# Patient Record
Sex: FEMALE
[De-identification: names, ages, dates, MRNs, and addresses within clinical notes are randomized per-mention and may not be internally consistent; named-entity substitution may affect disease eponyms.]

---

## 2003-09-29 LAB
AMB EXT ANTIBODY SCREEN: NEGATIVE
AMB EXT HBSAG SCREEN: NOT DETECTED
AMB EXT RH FACTOR: POSITIVE

## 2016-08-17 LAB
AMB EXT CHOL/HDL RATIO: 3.1
AMB EXT CHOLESTEROL, TOTAL: 172 MG/DL
AMB EXT CREATININE: 0.5 MG/DL
AMB EXT GFR: 140
AMB EXT GLUCOSE: 90 MG/DL
AMB EXT HDL CHOLESTEROL: 55 MG/DL
AMB EXT LDL CHOLESTEROL, DIRECT: 100 MG/DL
AMB EXT TRIGLYCERIDES: 85 MG/DL
AMB EXT TSH: 0.53 MIU/ML
AMB EXT VLDL: 17 MG/DL
HCT: 38.6 % (ref 35–48)
HGB: 12.5 G/DL (ref 12–16)
MEAN CELL VOLUME: 88.7 FL (ref 80–100)
PLATELETS: 245 10(3)UL
WBC: 8.8 X10(3) UL (ref 4–11)

## 2017-02-03 ENCOUNTER — PRIOR ORIGINAL RECORDS (OUTPATIENT)
Dept: OTHER | Age: 37
End: 2017-02-03

## 2017-02-03 ENCOUNTER — MYAURORA ACCOUNT LINK (OUTPATIENT)
Dept: OTHER | Age: 37
End: 2017-02-03

## 2017-02-15 ENCOUNTER — HOSPITAL ENCOUNTER (OUTPATIENT)
Dept: CV DIAGNOSTICS | Facility: HOSPITAL | Age: 37
End: 2017-02-15
Attending: INTERNAL MEDICINE
Payer: MEDICAID

## 2017-02-15 ENCOUNTER — HOSPITAL ENCOUNTER (OUTPATIENT)
Dept: ULTRASOUND IMAGING | Facility: HOSPITAL | Age: 37
Discharge: HOME | End: 2017-02-15
Attending: INTERNAL MEDICINE
Payer: MEDICAID

## 2017-02-15 ENCOUNTER — HOSPITAL ENCOUNTER (OUTPATIENT)
Dept: MAMMOGRAPHY | Facility: HOSPITAL | Age: 37
Discharge: HOME | End: 2017-02-15
Attending: INTERNAL MEDICINE
Payer: MEDICAID

## 2017-02-15 DIAGNOSIS — N64.4 BREAST PAIN: ICD-10-CM

## 2017-02-15 PROCEDURE — 76642 ULTRASOUND BREAST LIMITED: CPT

## 2017-02-15 PROCEDURE — G0204 DX MAMMO INCL CAD BI: HCPCS

## 2017-02-15 PROCEDURE — 77066 DX MAMMO INCL CAD BI: CPT

## 2017-02-15 PROCEDURE — 77062 BREAST TOMOSYNTHESIS BI: CPT

## 2017-08-21 ENCOUNTER — HOSPITAL ENCOUNTER (EMERGENCY)
Facility: HOSPITAL | Age: 37
Discharge: HOME | End: 2017-08-21
Attending: EMERGENCY MEDICINE
Payer: MEDICAID

## 2017-08-21 ENCOUNTER — APPOINTMENT (OUTPATIENT)
Dept: CT IMAGING | Facility: HOSPITAL | Age: 37
End: 2017-08-21
Attending: EMERGENCY MEDICINE
Payer: MEDICAID

## 2017-08-21 VITALS
HEART RATE: 89 BPM | OXYGEN SATURATION: 100 % | DIASTOLIC BLOOD PRESSURE: 75 MMHG | TEMPERATURE: 98 F | HEIGHT: 64 IN | BODY MASS INDEX: 20.83 KG/M2 | WEIGHT: 122 LBS | RESPIRATION RATE: 18 BRPM | SYSTOLIC BLOOD PRESSURE: 115 MMHG

## 2017-08-21 DIAGNOSIS — R55 SYNCOPE AND COLLAPSE: Primary | ICD-10-CM

## 2017-08-21 DIAGNOSIS — E86.0 DEHYDRATION: ICD-10-CM

## 2017-08-21 DIAGNOSIS — N30.00 ACUTE CYSTITIS WITHOUT HEMATURIA: ICD-10-CM

## 2017-08-21 LAB
ANION GAP SERPL CALC-SCNC: 6 MMOL/L (ref 0–18)
B-HCG UR QL: NEGATIVE
BASOPHILS # BLD: 0.1 K/UL (ref 0–0.2)
BASOPHILS NFR BLD: 1 %
BILIRUB UR QL: NEGATIVE
BUN SERPL-MCNC: 11 MG/DL (ref 8–20)
BUN/CREAT SERPL: 11.5 (ref 10–20)
CALCIUM SERPL-MCNC: 9.1 MG/DL (ref 8.5–10.5)
CHLORIDE SERPL-SCNC: 109 MMOL/L (ref 95–110)
CO2 SERPL-SCNC: 21 MMOL/L (ref 22–32)
COLOR UR: YELLOW
CREAT SERPL-MCNC: 0.96 MG/DL (ref 0.5–1.5)
EOSINOPHIL # BLD: 0.1 K/UL (ref 0–0.7)
EOSINOPHIL NFR BLD: 1 %
ERYTHROCYTE [DISTWIDTH] IN BLOOD BY AUTOMATED COUNT: 13.2 % (ref 11–15)
GLUCOSE SERPL-MCNC: 113 MG/DL (ref 70–99)
GLUCOSE UR-MCNC: NEGATIVE MG/DL
HCT VFR BLD AUTO: 38.3 % (ref 35–48)
HGB BLD-MCNC: 13.1 G/DL (ref 12–16)
HYALINE CASTS #/AREA URNS AUTO: 7 /LPF
LACTATE SERPL-SCNC: 0.8 MMOL/L (ref 0.5–2.2)
LYMPHOCYTES # BLD: 2.7 K/UL (ref 1–4)
LYMPHOCYTES NFR BLD: 26 %
MCH RBC QN AUTO: 29.1 PG (ref 27–32)
MCHC RBC AUTO-ENTMCNC: 34.1 G/DL (ref 32–37)
MCV RBC AUTO: 85.1 FL (ref 80–100)
MONOCYTES # BLD: 0.7 K/UL (ref 0–1)
MONOCYTES NFR BLD: 7 %
NEUTROPHILS # BLD AUTO: 6.6 K/UL (ref 1.8–7.7)
NEUTROPHILS NFR BLD: 64 %
NITRITE UR QL STRIP.AUTO: NEGATIVE
OSMOLALITY UR CALC.SUM OF ELEC: 282 MOSM/KG (ref 275–295)
PH UR: 6 [PH] (ref 5–8)
PLATELET # BLD AUTO: 204 K/UL (ref 140–400)
PMV BLD AUTO: 9.1 FL (ref 7.4–10.3)
POTASSIUM SERPL-SCNC: 3.7 MMOL/L (ref 3.3–5.1)
PROT UR-MCNC: NEGATIVE MG/DL
RBC # BLD AUTO: 4.51 M/UL (ref 3.7–5.4)
RBC #/AREA URNS AUTO: 18 /HPF
SODIUM SERPL-SCNC: 136 MMOL/L (ref 136–144)
SP GR UR STRIP: 1.02 (ref 1–1.03)
TROPONIN I SERPL-MCNC: 0 NG/ML (ref ?–0.03)
UROBILINOGEN UR STRIP-ACNC: 2
VIT C UR-MCNC: NEGATIVE MG/DL
WBC # BLD AUTO: 10.2 K/UL (ref 4–11)
WBC #/AREA URNS AUTO: 7 /HPF

## 2017-08-21 PROCEDURE — 96375 TX/PRO/DX INJ NEW DRUG ADDON: CPT

## 2017-08-21 PROCEDURE — 93010 ELECTROCARDIOGRAM REPORT: CPT | Performed by: EMERGENCY MEDICINE

## 2017-08-21 PROCEDURE — 83605 ASSAY OF LACTIC ACID: CPT | Performed by: EMERGENCY MEDICINE

## 2017-08-21 PROCEDURE — 81001 URINALYSIS AUTO W/SCOPE: CPT | Performed by: EMERGENCY MEDICINE

## 2017-08-21 PROCEDURE — 85025 COMPLETE CBC W/AUTO DIFF WBC: CPT | Performed by: EMERGENCY MEDICINE

## 2017-08-21 PROCEDURE — 93005 ELECTROCARDIOGRAM TRACING: CPT

## 2017-08-21 PROCEDURE — 80048 BASIC METABOLIC PNL TOTAL CA: CPT | Performed by: EMERGENCY MEDICINE

## 2017-08-21 PROCEDURE — 96365 THER/PROPH/DIAG IV INF INIT: CPT

## 2017-08-21 PROCEDURE — 99285 EMERGENCY DEPT VISIT HI MDM: CPT

## 2017-08-21 PROCEDURE — 96361 HYDRATE IV INFUSION ADD-ON: CPT

## 2017-08-21 PROCEDURE — 87086 URINE CULTURE/COLONY COUNT: CPT | Performed by: EMERGENCY MEDICINE

## 2017-08-21 PROCEDURE — 84484 ASSAY OF TROPONIN QUANT: CPT | Performed by: EMERGENCY MEDICINE

## 2017-08-21 PROCEDURE — 70450 CT HEAD/BRAIN W/O DYE: CPT | Performed by: EMERGENCY MEDICINE

## 2017-08-21 PROCEDURE — 81025 URINE PREGNANCY TEST: CPT

## 2017-08-21 RX ORDER — NITROFURANTOIN 25; 75 MG/1; MG/1
100 CAPSULE ORAL 2 TIMES DAILY
Qty: 10 CAPSULE | Refills: 0 | Status: SHIPPED | OUTPATIENT
Start: 2017-08-21 | End: 2017-08-26

## 2017-08-21 RX ORDER — KETOROLAC TROMETHAMINE 30 MG/ML
15 INJECTION, SOLUTION INTRAMUSCULAR; INTRAVENOUS ONCE
Status: COMPLETED | OUTPATIENT
Start: 2017-08-21 | End: 2017-08-21

## 2017-08-21 RX ORDER — CIPROFLOXACIN 2 MG/ML
400 INJECTION, SOLUTION INTRAVENOUS ONCE
Status: COMPLETED | OUTPATIENT
Start: 2017-08-21 | End: 2017-08-21

## 2017-08-21 NOTE — ED INITIAL ASSESSMENT (HPI)
States she \"  Passed out\" PTA. No Trauma noted   states that he Pt's eye rolled back and she was \" pit  For 20 sec\". No N/V. No ETOH usage.

## 2017-08-21 NOTE — ED NOTES
Patient was standing up talking to a friend, when she started to feel nauseous and dizzy. Had a syncopal episode that lasted 30 seconds, did not hit head. Now she feels tired, weak and dizzy. States she had a migraine earlier today, and took furecet.

## 2017-08-21 NOTE — ED PROVIDER NOTES
Patient Seen in: [facility] AND CLINICS Emergency Department    History   No chief complaint on file. Stated Complaint: Syncope    HPI    39year old female with pmh frequent migraines on fioricet, GERD who presents with acute syncopal episode just pta.  Pt 100 MG Oral Tab,  Take 1 tablet (100 mg total) by mouth every 2 (two) hours as needed for Migraine.  Use at onset; repeat once after 2 HRS-ONLY 2 IN 24 HR MAX       Family History   Problem Relation Age of Onset   • Diabetes Father      CRF   • Hypertension time. She has normal strength. She is not disoriented. She displays no tremor. No cranial nerve deficit or sensory deficit. She displays no seizure activity. Coordination and gait normal. GCS eye subscore is 4. GCS verbal subscore is 5.  GCS motor subscore noted on EKG Report.   Rate: 93  Rhythm: Sinus Rhythm  Reading: NSR - no prolonged QT, Brugada, or WPW           ============================================================  ED Course  ------------------------------------------------------------  MDM     P hospitalization for a related event. \"    -------------------------    Patient is feeling better, compared to previous encounters her blood pressure seems consistent as she is usually 90s–100s sbp.  Lactate negative, trop negative, no cp, mild ha with lebron

## 2017-09-20 ENCOUNTER — HOSPITAL ENCOUNTER (OUTPATIENT)
Age: 37
Discharge: HOME | End: 2017-09-20
Attending: EMERGENCY MEDICINE
Payer: MEDICAID

## 2017-09-20 VITALS
RESPIRATION RATE: 16 BRPM | WEIGHT: 123 LBS | DIASTOLIC BLOOD PRESSURE: 71 MMHG | HEART RATE: 104 BPM | TEMPERATURE: 98 F | OXYGEN SATURATION: 98 % | SYSTOLIC BLOOD PRESSURE: 110 MMHG | BODY MASS INDEX: 21 KG/M2

## 2017-09-20 DIAGNOSIS — J02.0 STREP PHARYNGITIS: Primary | ICD-10-CM

## 2017-09-20 LAB — S PYO AG THROAT QL: POSITIVE

## 2017-09-20 PROCEDURE — 87430 STREP A AG IA: CPT

## 2017-09-20 PROCEDURE — 99213 OFFICE O/P EST LOW 20 MIN: CPT

## 2017-09-20 PROCEDURE — 99214 OFFICE O/P EST MOD 30 MIN: CPT

## 2017-09-20 RX ORDER — AMOXICILLIN 500 MG/1
500 TABLET, FILM COATED ORAL 3 TIMES DAILY
Qty: 30 TABLET | Refills: 0 | Status: SHIPPED | OUTPATIENT
Start: 2017-09-20 | End: 2017-09-30

## 2017-09-20 NOTE — ED PROVIDER NOTES
Patient Seen in: [facility] AND CLINICS Immediate Care In [address] [place] [address]    History   Patient presents with:  Cough/URI    Stated Complaint: Sore Throat/Fever/HA    HPI    Patient is a 40-year-old female complains of 5 days of sore throat congestion and cough.   She ear normal.   Nose: Nose normal.   Mouth/Throat: Oropharynx is injected there is tonsillar hypertrophy there is no exudate  Eyes: Conjunctivae and EOM are normal. Pupils are equal, round, and reactive to light. Neck: Neck supple.    Cardiovascular: Normal

## 2017-12-02 ENCOUNTER — HOSPITAL ENCOUNTER (OUTPATIENT)
Age: 37
Discharge: HOME | End: 2017-12-02
Attending: PEDIATRICS
Payer: MEDICAID

## 2017-12-02 VITALS
SYSTOLIC BLOOD PRESSURE: 109 MMHG | WEIGHT: 125 LBS | RESPIRATION RATE: 18 BRPM | HEART RATE: 85 BPM | OXYGEN SATURATION: 100 % | BODY MASS INDEX: 21 KG/M2 | DIASTOLIC BLOOD PRESSURE: 71 MMHG | TEMPERATURE: 98 F

## 2017-12-02 DIAGNOSIS — R81 GLUCOSURIA: ICD-10-CM

## 2017-12-02 DIAGNOSIS — N30.01 ACUTE CYSTITIS WITH HEMATURIA: Primary | ICD-10-CM

## 2017-12-02 PROCEDURE — 82962 GLUCOSE BLOOD TEST: CPT

## 2017-12-02 PROCEDURE — 81025 URINE PREGNANCY TEST: CPT

## 2017-12-02 PROCEDURE — 99214 OFFICE O/P EST MOD 30 MIN: CPT

## 2017-12-02 PROCEDURE — 81002 URINALYSIS NONAUTO W/O SCOPE: CPT

## 2017-12-02 PROCEDURE — 99213 OFFICE O/P EST LOW 20 MIN: CPT

## 2017-12-02 RX ORDER — SULFAMETHOXAZOLE AND TRIMETHOPRIM 800; 160 MG/1; MG/1
1 TABLET ORAL 2 TIMES DAILY
Qty: 10 TABLET | Refills: 0 | Status: SHIPPED | OUTPATIENT
Start: 2017-12-02 | End: 2017-12-07

## 2017-12-02 RX ORDER — ERGOCALCIFEROL 1.25 MG/1
CAPSULE ORAL
Refills: 5 | COMMUNITY
Start: 2017-09-14

## 2017-12-02 RX ORDER — SULFAMETHOXAZOLE AND TRIMETHOPRIM 800; 160 MG/1; MG/1
1 TABLET ORAL 2 TIMES DAILY
Qty: 10 TABLET | Refills: 0 | Status: SHIPPED | OUTPATIENT
Start: 2017-12-02 | End: 2017-12-02

## 2017-12-03 NOTE — ED PROVIDER NOTES
Patient Seen in: [facility] AND CLIN[facility] Immediate Care In [address] [place] [address]    History   Patient presents with:  Urinary Symptoms (urologic)    Stated Complaint: painful urination    HPI    Patient complains of urinary frequency, urgency and dysuria that began 3 day and vital signs reviewed. All other systems reviewed and negative except as noted above. PSFH elements reviewed from today and agreed except as otherwise stated in HPI.     Physical Exam   ED Triage Vitals [12/02/17 1739]  BP: 109/71  Pulse: 85  Res

## 2017-12-08 LAB
AMB EXT CREATININE: 0.7 MG/DL
AMB EXT GFR: 94
AMB EXT GLUCOSE: 93 MG/DL
AMB EXT HGBA1C: 5.3 %

## 2018-03-05 ENCOUNTER — HOSPITAL ENCOUNTER (EMERGENCY)
Facility: HOSPITAL | Age: 38
Discharge: HOME | End: 2018-03-05
Attending: EMERGENCY MEDICINE
Payer: MEDICAID

## 2018-03-05 VITALS
TEMPERATURE: 98 F | OXYGEN SATURATION: 100 % | BODY MASS INDEX: 21.34 KG/M2 | SYSTOLIC BLOOD PRESSURE: 119 MMHG | DIASTOLIC BLOOD PRESSURE: 80 MMHG | RESPIRATION RATE: 16 BRPM | HEART RATE: 87 BPM | WEIGHT: 125 LBS | HEIGHT: 64 IN

## 2018-03-05 DIAGNOSIS — H66.90 ACUTE OTITIS MEDIA, UNSPECIFIED OTITIS MEDIA TYPE: Primary | ICD-10-CM

## 2018-03-05 PROCEDURE — 99283 EMERGENCY DEPT VISIT LOW MDM: CPT

## 2018-03-05 RX ORDER — AMOXICILLIN 500 MG/1
500 TABLET, FILM COATED ORAL 3 TIMES DAILY
Qty: 30 TABLET | Refills: 0 | Status: SHIPPED | OUTPATIENT
Start: 2018-03-05 | End: 2018-03-15

## 2018-03-05 NOTE — ED INITIAL ASSESSMENT (HPI)
Left ear pain started last night, feels like \"hit knife going through ear. \" No drainage. Took Motrin 600mg last night at 2300 with mild relief for 10-15 minutes.

## 2018-03-05 NOTE — ED PROVIDER NOTES
Patient Seen in: [facility] AND [facility] Emergency Department    History   Patient presents with:  Ear Problem Pain (neurosensory)    Stated Complaint: left ear pain     HPI    Left ear pain started last night and has worsened. No fever. No URI symptoms.  No tr There is no rebound and no guarding. Musculoskeletal: Normal range of motion. She exhibits no edema or tenderness. Lymphadenopathy:     She has no cervical adenopathy. Neurological: She is alert and oriented to person, place, and time.  No cranial ner

## 2018-10-22 ENCOUNTER — HOSPITAL ENCOUNTER (EMERGENCY)
Facility: HOSPITAL | Age: 38
Discharge: STILL ADMITTED OR ADMINISTRATIVE | End: 2018-10-22
Payer: MEDICAID

## 2019-02-18 ENCOUNTER — HOSPITAL ENCOUNTER (EMERGENCY)
Facility: HOSPITAL | Age: 39
Discharge: HOME | End: 2019-02-18
Attending: EMERGENCY MEDICINE
Payer: MEDICAID

## 2019-02-18 VITALS
DIASTOLIC BLOOD PRESSURE: 76 MMHG | WEIGHT: 104 LBS | HEART RATE: 90 BPM | RESPIRATION RATE: 18 BRPM | TEMPERATURE: 99 F | SYSTOLIC BLOOD PRESSURE: 107 MMHG | OXYGEN SATURATION: 97 % | BODY MASS INDEX: 17.75 KG/M2 | HEIGHT: 64 IN

## 2019-02-18 DIAGNOSIS — B34.9 VIRAL SYNDROME: Primary | ICD-10-CM

## 2019-02-18 DIAGNOSIS — R10.32 ABDOMINAL PAIN, LEFT LOWER QUADRANT: ICD-10-CM

## 2019-02-18 LAB
ANION GAP SERPL CALC-SCNC: 6 MMOL/L (ref 0–18)
B-HCG UR QL: NEGATIVE
BASOPHILS # BLD AUTO: 0.07 X10(3) UL (ref 0–0.2)
BASOPHILS NFR BLD AUTO: 0.8 %
BILIRUB UR QL: NEGATIVE
BUN BLD-MCNC: 7 MG/DL (ref 7–18)
BUN/CREAT SERPL: 7.9 (ref 10–20)
CALCIUM BLD-MCNC: 9.2 MG/DL (ref 8.5–10.1)
CHLORIDE SERPL-SCNC: 110 MMOL/L (ref 98–107)
CO2 SERPL-SCNC: 23 MMOL/L (ref 21–32)
COLOR UR: YELLOW
CREAT BLD-MCNC: 0.89 MG/DL (ref 0.55–1.02)
DEPRECATED RDW RBC AUTO: 44.4 FL (ref 35.1–46.3)
EOSINOPHIL # BLD AUTO: 0.04 X10(3) UL (ref 0–0.7)
EOSINOPHIL NFR BLD AUTO: 0.5 %
ERYTHROCYTE [DISTWIDTH] IN BLOOD BY AUTOMATED COUNT: 13.4 % (ref 11–15)
GLUCOSE BLD-MCNC: 91 MG/DL (ref 70–99)
GLUCOSE UR-MCNC: NEGATIVE MG/DL
HCT VFR BLD AUTO: 39.2 % (ref 35–48)
HGB BLD-MCNC: 12.4 G/DL (ref 12–16)
IMM GRANULOCYTES # BLD AUTO: 0.03 X10(3) UL (ref 0–1)
IMM GRANULOCYTES NFR BLD: 0.3 %
LYMPHOCYTES # BLD AUTO: 1.22 X10(3) UL (ref 1–4)
LYMPHOCYTES NFR BLD AUTO: 14.1 %
MCH RBC QN AUTO: 28.4 PG (ref 26–34)
MCHC RBC AUTO-ENTMCNC: 31.6 G/DL (ref 31–37)
MCV RBC AUTO: 89.9 FL (ref 80–100)
MONOCYTES # BLD AUTO: 0.79 X10(3) UL (ref 0.1–1)
MONOCYTES NFR BLD AUTO: 9.1 %
NEUTROPHILS # BLD AUTO: 6.5 X10 (3) UL (ref 1.5–7.7)
NEUTROPHILS # BLD AUTO: 6.5 X10(3) UL (ref 1.5–7.7)
NEUTROPHILS NFR BLD AUTO: 75.2 %
NITRITE UR QL STRIP.AUTO: NEGATIVE
OSMOLALITY SERPL CALC.SUM OF ELEC: 286 MOSM/KG (ref 275–295)
PH UR: 6 [PH] (ref 5–8)
PLATELET # BLD AUTO: 250 10(3)UL (ref 150–450)
POTASSIUM SERPL-SCNC: 4 MMOL/L (ref 3.5–5.1)
PROT UR-MCNC: NEGATIVE MG/DL
RBC # BLD AUTO: 4.36 X10(6)UL (ref 3.8–5.3)
RBC #/AREA URNS AUTO: 35 /HPF
SODIUM SERPL-SCNC: 139 MMOL/L (ref 136–145)
SP GR UR STRIP: 1.02 (ref 1–1.03)
UROBILINOGEN UR STRIP-ACNC: <2
VIT C UR-MCNC: 20 MG/DL
WBC # BLD AUTO: 8.7 X10(3) UL (ref 4–11)
WBC #/AREA URNS AUTO: 5 /HPF

## 2019-02-18 PROCEDURE — 80048 BASIC METABOLIC PNL TOTAL CA: CPT | Performed by: EMERGENCY MEDICINE

## 2019-02-18 PROCEDURE — 99285 EMERGENCY DEPT VISIT HI MDM: CPT

## 2019-02-18 PROCEDURE — 87591 N.GONORRHOEAE DNA AMP PROB: CPT | Performed by: EMERGENCY MEDICINE

## 2019-02-18 PROCEDURE — 87808 TRICHOMONAS ASSAY W/OPTIC: CPT | Performed by: EMERGENCY MEDICINE

## 2019-02-18 PROCEDURE — 81025 URINE PREGNANCY TEST: CPT

## 2019-02-18 PROCEDURE — 81001 URINALYSIS AUTO W/SCOPE: CPT | Performed by: EMERGENCY MEDICINE

## 2019-02-18 PROCEDURE — 85025 COMPLETE CBC W/AUTO DIFF WBC: CPT | Performed by: EMERGENCY MEDICINE

## 2019-02-18 PROCEDURE — 96360 HYDRATION IV INFUSION INIT: CPT

## 2019-02-18 PROCEDURE — 87106 FUNGI IDENTIFICATION YEAST: CPT | Performed by: EMERGENCY MEDICINE

## 2019-02-18 PROCEDURE — 87205 SMEAR GRAM STAIN: CPT | Performed by: EMERGENCY MEDICINE

## 2019-02-18 PROCEDURE — 87491 CHLMYD TRACH DNA AMP PROBE: CPT | Performed by: EMERGENCY MEDICINE

## 2019-02-18 RX ORDER — ACETAMINOPHEN 325 MG/1
650 TABLET ORAL ONCE
Status: COMPLETED | OUTPATIENT
Start: 2019-02-18 | End: 2019-02-18

## 2019-02-18 NOTE — ED PROVIDER NOTES
Patient Seen in: [facility] AND [facility] Emergency Department    History   Patient presents with:  Abdomen/Flank Pain (GI/)    Stated Complaint: body aches, abd pain,    HPI    Patient is a 40-year-old female who presents to the emergency department with a c Abdominal: Normal appearance and bowel sounds are normal. There is tenderness in the left lower quadrant. There is no CVA tenderness. Genitourinary: Vagina normal and uterus normal. Cervix exhibits no motion tenderness and no discharge.  Right adnexum dis Abdominal pain, left lower quadrant    Disposition:  There is no disposition on file for this visit. There is no disposition time on file for this visit.     Follow-up:  your doctor    In 2 days          Medications Prescribed:  Current Discharge Medicatio

## 2019-02-19 LAB
C TRACH DNA SPEC QL NAA+PROBE: NEGATIVE
N GONORRHOEA DNA SPEC QL NAA+PROBE: NEGATIVE

## 2019-02-20 ENCOUNTER — HOSPITAL (OUTPATIENT)
Dept: OTHER | Age: 39
End: 2019-02-20
Attending: EMERGENCY MEDICINE

## 2019-02-20 LAB
ALBUMIN SERPL-MCNC: 3.6 GM/DL (ref 3.6–5.1)
ALBUMIN/GLOB SERPL: 1.1 {RATIO} (ref 1–2.4)
ALP SERPL-CCNC: 59 UNIT/L (ref 45–117)
ALT SERPL-CCNC: 20 UNIT/L
AMORPH SED URNS QL MICRO: ABNORMAL
ANALYZER ANC (IANC): ABNORMAL
ANION GAP SERPL CALC-SCNC: 12 MMOL/L (ref 10–20)
APPEARANCE UR: ABNORMAL
AST SERPL-CCNC: 20 UNIT/L
BACTERIA #/AREA URNS HPF: ABNORMAL /HPF
BASOPHILS # BLD: 0 THOUSAND/MCL (ref 0–0.3)
BASOPHILS NFR BLD: 1 %
BILIRUB SERPL-MCNC: <0.1 MG/DL (ref 0.2–1)
BILIRUB UR QL: NEGATIVE
BUN SERPL-MCNC: 14 MG/DL (ref 6–20)
BUN/CREAT SERPL: 15 (ref 7–25)
CALCIUM SERPL-MCNC: 8.6 MG/DL (ref 8.4–10.2)
CAOX CRY URNS QL MICRO: ABNORMAL
CHLORIDE: 105 MMOL/L (ref 98–107)
CO2 SERPL-SCNC: 27 MMOL/L (ref 21–32)
COLOR UR: ABNORMAL
CREAT SERPL-MCNC: 0.91 MG/DL (ref 0.51–0.95)
DIFFERENTIAL METHOD BLD: ABNORMAL
EOSINOPHIL # BLD: 0 THOUSAND/MCL (ref 0.1–0.5)
EOSINOPHIL NFR BLD: 0 %
EPITH CASTS #/AREA URNS LPF: ABNORMAL /[LPF]
ERYTHROCYTE [DISTWIDTH] IN BLOOD: 13.3 % (ref 11–15)
FATTY CASTS #/AREA URNS LPF: ABNORMAL /[LPF]
GENITAL VAGINOSIS SCREEN: NEGATIVE
GLOBULIN SER-MCNC: 3.4 GM/DL (ref 2–4)
GLUCOSE SERPL-MCNC: 91 MG/DL (ref 65–99)
GLUCOSE UR-MCNC: NEGATIVE MG/DL
GRAN CASTS #/AREA URNS LPF: ABNORMAL /[LPF]
HEMATOCRIT: 38.2 % (ref 36–46.5)
HGB BLD-MCNC: 12.2 GM/DL (ref 12–15.5)
HGB UR QL: ABNORMAL
HYALINE CASTS #/AREA URNS LPF: ABNORMAL /LPF (ref 0–5)
IMM GRANULOCYTES # BLD AUTO: 0 THOUSAND/MCL (ref 0–0.2)
IMM GRANULOCYTES NFR BLD: 0 %
KETONES UR-MCNC: NEGATIVE MG/DL
LEUKOCYTE ESTERASE UR QL STRIP: ABNORMAL
LIPASE SERPL-CCNC: 128 UNIT/L (ref 73–393)
LYMPHOCYTES # BLD: 1 THOUSAND/MCL (ref 1–4.8)
LYMPHOCYTES NFR BLD: 15 %
MAGNESIUM SERPL-MCNC: 1.6 MG/DL (ref 1.7–2.4)
MCH RBC QN AUTO: 28.6 PG (ref 26–34)
MCHC RBC AUTO-ENTMCNC: 31.9 GM/DL (ref 32–36.5)
MCV RBC AUTO: 89.5 FL (ref 78–100)
MIXED CELL CASTS #/AREA URNS LPF: ABNORMAL /[LPF]
MONOCYTES # BLD: 0.7 THOUSAND/MCL (ref 0.3–0.9)
MONOCYTES NFR BLD: 10 %
MUCOUS THREADS URNS QL MICRO: PRESENT
NEUTROPHILS # BLD: 5.1 THOUSAND/MCL (ref 1.8–7.7)
NEUTROPHILS NFR BLD: 74 %
NEUTS SEG NFR BLD: ABNORMAL %
NITRITE UR QL: NEGATIVE
NRBC (NRBCRE): 0 /100 WBC
PH UR: 7 UNIT (ref 5–7)
PLATELET # BLD: 211 THOUSAND/MCL (ref 140–450)
POTASSIUM SERPL-SCNC: 4.3 MMOL/L (ref 3.4–5.1)
PROCALCITONIN SERPL IA-MCNC: <0.05 NG/ML
PROT SERPL-MCNC: 7 GM/DL (ref 6.4–8.2)
PROT UR QL: 30 MG/DL
RBC # BLD: 4.27 MILLION/MCL (ref 4–5.2)
RBC #/AREA URNS HPF: >100 /HPF (ref 0–3)
RBC CASTS #/AREA URNS LPF: ABNORMAL /[LPF]
RENAL EPI CELLS #/AREA URNS HPF: ABNORMAL /[HPF]
SODIUM SERPL-SCNC: 140 MMOL/L (ref 135–145)
SP GR UR: 1.02 (ref 1–1.03)
SPECIMEN SOURCE: ABNORMAL
SPERM URNS QL MICRO: ABNORMAL
SQUAMOUS #/AREA URNS HPF: ABNORMAL /HPF (ref 0–5)
T VAGINALIS URNS QL MICRO: ABNORMAL
TRI-PHOS CRY URNS QL MICRO: ABNORMAL
TRICHOMONAS SCREEN: NEGATIVE
URATE CRY URNS QL MICRO: ABNORMAL
URINE REFLEX: ABNORMAL
URNS CMNT MICRO: ABNORMAL
UROBILINOGEN UR QL: 0.2 MG/DL (ref 0–1)
WAXY CASTS #/AREA URNS LPF: ABNORMAL /[LPF]
WBC # BLD: 7 THOUSAND/MCL (ref 4.2–11)
WBC #/AREA URNS HPF: ABNORMAL /HPF (ref 0–5)
WBC CASTS #/AREA URNS LPF: ABNORMAL /[LPF]
YEAST HYPHAE URNS QL MICRO: ABNORMAL
YEAST URNS QL MICRO: ABNORMAL

## 2019-03-01 VITALS
HEART RATE: 99 BPM | OXYGEN SATURATION: 100 % | HEIGHT: 64 IN | WEIGHT: 122 LBS | DIASTOLIC BLOOD PRESSURE: 80 MMHG | BODY MASS INDEX: 20.83 KG/M2 | SYSTOLIC BLOOD PRESSURE: 99 MMHG | RESPIRATION RATE: 8 BRPM

## 2019-06-28 ENCOUNTER — HOSPITAL ENCOUNTER (EMERGENCY)
Facility: HOSPITAL | Age: 39
Discharge: HOME | End: 2019-06-29
Attending: EMERGENCY MEDICINE

## 2019-06-28 DIAGNOSIS — T18.9XXA SWALLOWED FOREIGN BODY, INITIAL ENCOUNTER: ICD-10-CM

## 2019-06-28 DIAGNOSIS — J02.9 PHARYNGITIS, UNSPECIFIED ETIOLOGY: Primary | ICD-10-CM

## 2019-06-28 PROCEDURE — 99283 EMERGENCY DEPT VISIT LOW MDM: CPT

## 2019-06-29 ENCOUNTER — APPOINTMENT (OUTPATIENT)
Dept: GENERAL RADIOLOGY | Facility: HOSPITAL | Age: 39
End: 2019-06-29
Attending: EMERGENCY MEDICINE

## 2019-06-29 VITALS
HEART RATE: 90 BPM | DIASTOLIC BLOOD PRESSURE: 72 MMHG | BODY MASS INDEX: 18.78 KG/M2 | RESPIRATION RATE: 18 BRPM | OXYGEN SATURATION: 98 % | HEIGHT: 64 IN | WEIGHT: 110 LBS | SYSTOLIC BLOOD PRESSURE: 106 MMHG | TEMPERATURE: 98 F

## 2019-06-29 PROCEDURE — 71045 X-RAY EXAM CHEST 1 VIEW: CPT | Performed by: EMERGENCY MEDICINE

## 2019-06-29 RX ORDER — LIDOCAINE HYDROCHLORIDE 20 MG/ML
10 SOLUTION OROPHARYNGEAL ONCE
Status: COMPLETED | OUTPATIENT
Start: 2019-06-29 | End: 2019-06-29

## 2019-06-29 NOTE — ED INITIAL ASSESSMENT (HPI)
Pt arrive in ER per [facility] ambulance with c/o pain to her throat after she possibly swallow a chip from a broken glass, pt is talking in upon arrival with no distress noted and hacking once in a awhile, no blood noted on her spit

## 2019-06-29 NOTE — ED PROVIDER NOTES
Patient Seen in: [facility] AND [facility] Emergency Department    History   Patient presents with:  Foreign Body      HPI    Patient presents to the ED after possibly swallowing a piece of broken glass.   She states that she was drinking from a glass at a restau Resp 18   Temp 98.2 °F (36.8 °C)   Temp src Oral   SpO2 98 %   O2 Device None (Room air)       Physical Exam   Constitutional: She is oriented to person, place, and time. She appears well-developed and well-nourished. No distress.    HENT:   Head: Normoce already has does not have a problem list on file. to contribute to the complexity of this ED evaluation. ED Course: Patient presents to the ED after possibly ingesting a extremely small piece of glass.   Patient brings the glass with her to the ED and th

## 2019-08-01 ENCOUNTER — OFFICE VISIT (OUTPATIENT)
Dept: FAMILY MEDICINE CLINIC | Facility: CLINIC | Age: 39
End: 2019-08-01

## 2019-08-01 VITALS
HEART RATE: 102 BPM | SYSTOLIC BLOOD PRESSURE: 108 MMHG | WEIGHT: 114 LBS | OXYGEN SATURATION: 97 % | DIASTOLIC BLOOD PRESSURE: 70 MMHG | BODY MASS INDEX: 19.23 KG/M2 | HEIGHT: 64.5 IN

## 2019-08-01 DIAGNOSIS — Z01.89 ENCOUNTER FOR ROUTINE LABORATORY TESTING: ICD-10-CM

## 2019-08-01 DIAGNOSIS — E55.9 VITAMIN D INSUFFICIENCY: ICD-10-CM

## 2019-08-01 DIAGNOSIS — R13.19 ESOPHAGEAL DYSPHAGIA: Primary | ICD-10-CM

## 2019-08-01 DIAGNOSIS — R13.10 ODYNOPHAGIA: ICD-10-CM

## 2019-08-01 DIAGNOSIS — M26.621 ARTHRALGIA OF RIGHT TEMPOROMANDIBULAR JOINT: ICD-10-CM

## 2019-08-01 PROBLEM — K14.6 BURNING TONGUE SYNDROME: Status: RESOLVED | Noted: 2019-05-01 | Resolved: 2019-08-01

## 2019-08-01 PROBLEM — F17.200 TOBACCO USE DISORDER: Status: ACTIVE | Noted: 2019-08-01

## 2019-08-01 PROBLEM — M26.629 TMJ ARTHRALGIA: Status: ACTIVE | Noted: 2019-05-01

## 2019-08-01 PROBLEM — K14.6 BURNING TONGUE SYNDROME: Status: ACTIVE | Noted: 2019-05-01

## 2019-08-01 PROCEDURE — 99202 OFFICE O/P NEW SF 15 MIN: CPT

## 2019-08-01 RX ORDER — LIDOCAINE HYDROCHLORIDE 20 MG/ML
5 SOLUTION OROPHARYNGEAL
Qty: 100 ML | Refills: 0 | Status: SHIPPED | OUTPATIENT
Start: 2019-08-01

## 2019-08-02 NOTE — PATIENT INSTRUCTIONS
Dysphagia (Adult)    Dysphagia is trouble swallowing. If you have dysphagia, you may have symptoms that include:  · Choking or coughing when you eat or drink  · Food getting stuck  · Drooling  · Inability to swallow  · Pain behind the breastbone after sw releases enzymes in your mouth that start the digestive process. Chew soft foods at least 5 to10 times. Chew more dense food (meats and vegetables) up to 30 times before swallowing.  Count the number of times you chew until you get a sense of how soft the f

## 2019-08-02 NOTE — PROGRESS NOTES
HPI:    Patient ID: Bhargavi Carbajal is a 45year old female. Throat Problem   This is a new (having pain and trouble swallowing both solids>liquids down the R side of her throat) problem.  The current episode started more than 1 month ago (after she swall Medications:  Diclofenac Sodium 50 MG Oral Tab EC Take 1 tablet (50 mg total) by mouth 2 (two) times daily. Disp: 60 tablet Rfl: 0   Lidocaine Viscous HCl 2 % Mouth/Throat Solution Take 5 mL by mouth every 3 (three) hours as needed for Pain.  Disp: 100 mL R CBC, PLATELET; NO DIFFERENTIAL; Future  - COMP METABOLIC PANEL (14); Future  - LIPID PANEL;  Future  - URINALYSIS, ROUTINE; Future    RTC if symptoms worsen or fail to improve and in 1 month for annual physical exam.      Orders Placed This Encounter      R

## 2019-08-05 ENCOUNTER — TELEPHONE (OUTPATIENT)
Dept: FAMILY MEDICINE CLINIC | Facility: CLINIC | Age: 39
End: 2019-08-05

## 2019-08-08 LAB
AMB EXT CHOL/HDL RATIO: 2.5
AMB EXT CHOLESTEROL, TOTAL: 179 MG/DL
AMB EXT CREATININE: 0.8 MG/DL
AMB EXT GFR: 80
AMB EXT GLUCOSE: 92 MG/DL
AMB EXT HDL CHOLESTEROL: 73 MG/DL
AMB EXT LDL CHOLESTEROL, DIRECT: 89 MG/DL
AMB EXT TRIGLYCERIDES: 89 MG/DL
AMB EXT VLDL: 18 MG/DL
HCT: 43.7 % (ref 35–48)
HGB: 14.1 G/DL (ref 12–16)
MEAN CELL VOLUME: 91.7 FL (ref 80–100)
PLATELETS: 314 10(3)UL
WBC: 9 X10(3) UL (ref 4–11)

## 2019-08-12 ENCOUNTER — OFFICE VISIT (OUTPATIENT)
Dept: OTOLARYNGOLOGY | Facility: CLINIC | Age: 39
End: 2019-08-12
Payer: COMMERCIAL

## 2019-08-12 VITALS
SYSTOLIC BLOOD PRESSURE: 102 MMHG | TEMPERATURE: 98 F | HEIGHT: 64.5 IN | DIASTOLIC BLOOD PRESSURE: 72 MMHG | WEIGHT: 114 LBS | BODY MASS INDEX: 19.23 KG/M2

## 2019-08-12 DIAGNOSIS — R07.0 THROAT PAIN: Primary | ICD-10-CM

## 2019-08-12 PROCEDURE — 31575 DIAGNOSTIC LARYNGOSCOPY: CPT | Performed by: OTOLARYNGOLOGY

## 2019-08-12 PROCEDURE — 99243 OFF/OP CNSLTJ NEW/EST LOW 30: CPT | Performed by: OTOLARYNGOLOGY

## 2019-08-13 NOTE — PROGRESS NOTES
Dayana Neal is a 45year old female.  Patient presents with:  Throat Problem: pt reports swallowed a piece of glass on 6/28/19 at a restaurant , discomfort when swallowing    HPI:   About 6 weeks ago she was eating in a restaurant and had a piece of her weight gain  SKIN: denies any unusual skin lesions or rashes  RESPIRATORY: denies shortness of breath with exertion  NEURO: denies headaches    EXAM:   /72 (BP Location: Left arm, Patient Position: Sitting, Cuff Size: adult)   Temp 97.7 °F (36.5 °C) and advanced  into the interior of the larynx. A thorough examination of the interior of the larynx was performed. Findings were as follows.        Hypopharynx/Larynx:  Epiglottis is normal.  Arytenoids:  Bilateral: Arytenoids are normal.  Vocal folds-fal

## 2019-08-28 ENCOUNTER — TELEPHONE (OUTPATIENT)
Dept: FAMILY MEDICINE CLINIC | Facility: CLINIC | Age: 39
End: 2019-08-28

## 2019-09-30 DIAGNOSIS — M26.621 ARTHRALGIA OF RIGHT TEMPOROMANDIBULAR JOINT: ICD-10-CM

## 2020-09-11 ENCOUNTER — HOSPITAL ENCOUNTER (OUTPATIENT)
Age: 40
Discharge: HOME | End: 2020-09-11
Attending: EMERGENCY MEDICINE
Payer: COMMERCIAL

## 2020-09-11 VITALS
HEART RATE: 107 BPM | SYSTOLIC BLOOD PRESSURE: 110 MMHG | RESPIRATION RATE: 18 BRPM | WEIGHT: 112 LBS | HEIGHT: 64 IN | TEMPERATURE: 97 F | BODY MASS INDEX: 19.12 KG/M2 | OXYGEN SATURATION: 100 % | DIASTOLIC BLOOD PRESSURE: 82 MMHG

## 2020-09-11 DIAGNOSIS — N30.01 ACUTE CYSTITIS WITH HEMATURIA: Primary | ICD-10-CM

## 2020-09-11 LAB
B-HCG UR QL: NEGATIVE
BILIRUB UR QL STRIP: NEGATIVE
GLUCOSE UR STRIP-MCNC: NEGATIVE MG/DL
KETONES UR STRIP-MCNC: NEGATIVE MG/DL
LEUKOCYTE ESTERASE UR QL STRIP: NEGATIVE
NITRITE UR QL STRIP: POSITIVE
PH UR STRIP: 5.5 [PH]
PROT UR STRIP-MCNC: NEGATIVE MG/DL
SP GR UR STRIP: <=1.005
UROBILINOGEN UR STRIP-ACNC: <2 MG/DL

## 2020-09-11 PROCEDURE — 81025 URINE PREGNANCY TEST: CPT | Performed by: EMERGENCY MEDICINE

## 2020-09-11 PROCEDURE — 99213 OFFICE O/P EST LOW 20 MIN: CPT | Performed by: EMERGENCY MEDICINE

## 2020-09-11 PROCEDURE — 81002 URINALYSIS NONAUTO W/O SCOPE: CPT | Performed by: EMERGENCY MEDICINE

## 2020-09-11 RX ORDER — PHENAZOPYRIDINE HYDROCHLORIDE 100 MG/1
100 TABLET, FILM COATED ORAL 3 TIMES DAILY PRN
Qty: 6 TABLET | Refills: 0 | Status: SHIPPED | OUTPATIENT
Start: 2020-09-11 | End: 2020-09-18

## 2020-09-11 RX ORDER — NITROFURANTOIN 25; 75 MG/1; MG/1
100 CAPSULE ORAL 2 TIMES DAILY
Qty: 20 CAPSULE | Refills: 0 | Status: SHIPPED | OUTPATIENT
Start: 2020-09-11 | End: 2020-09-21

## 2020-09-12 NOTE — ED PROVIDER NOTES
Patient Seen in: [facility] AND CLINICS Immediate Care In [address] [place] [address]    History   Patient presents with:  Urinary Symptoms    Stated Complaint: UTI    HPI    Patient complains of urinary frequency, urgency and dysuria that began 5.   Patient denies fever or ch problem.    Social History    Tobacco Use      Smoking status: Current Every Day Smoker        Packs/day: 1.00        Years: 27.00        Pack years: 27        Types: Cigarettes      Smokeless tobacco: Never Used    Alcohol use: No    Drug use: No      Revi re-check          Medications Prescribed:  Discharge Medication List as of 9/11/2020  7:11 PM    START taking these medications    Phenazopyridine HCl 100 MG Oral Tab  Take 1 tablet (100 mg total) by mouth 3 (three) times daily as needed for Pain., Normal,

## 2021-08-11 ENCOUNTER — OFFICE VISIT (OUTPATIENT)
Dept: FAMILY MEDICINE CLINIC | Facility: CLINIC | Age: 41
End: 2021-08-11
Payer: MEDICAID

## 2021-08-11 VITALS
SYSTOLIC BLOOD PRESSURE: 140 MMHG | BODY MASS INDEX: 20.49 KG/M2 | OXYGEN SATURATION: 97 % | WEIGHT: 120 LBS | RESPIRATION RATE: 16 BRPM | HEART RATE: 68 BPM | TEMPERATURE: 98 F | HEIGHT: 64 IN | DIASTOLIC BLOOD PRESSURE: 90 MMHG

## 2021-08-11 DIAGNOSIS — R30.0 DYSURIA: Primary | ICD-10-CM

## 2021-08-11 PROCEDURE — 3080F DIAST BP >= 90 MM HG: CPT | Performed by: NURSE PRACTITIONER

## 2021-08-11 PROCEDURE — 3077F SYST BP >= 140 MM HG: CPT | Performed by: NURSE PRACTITIONER

## 2021-08-11 PROCEDURE — 99213 OFFICE O/P EST LOW 20 MIN: CPT | Performed by: NURSE PRACTITIONER

## 2021-08-11 PROCEDURE — 3008F BODY MASS INDEX DOCD: CPT | Performed by: NURSE PRACTITIONER

## 2021-08-11 PROCEDURE — 87086 URINE CULTURE/COLONY COUNT: CPT | Performed by: NURSE PRACTITIONER

## 2021-08-11 PROCEDURE — 87077 CULTURE AEROBIC IDENTIFY: CPT | Performed by: NURSE PRACTITIONER

## 2021-08-11 PROCEDURE — 87186 SC STD MICRODIL/AGAR DIL: CPT | Performed by: NURSE PRACTITIONER

## 2021-08-11 RX ORDER — PHENAZOPYRIDINE HYDROCHLORIDE 200 MG/1
200 TABLET, FILM COATED ORAL 3 TIMES DAILY PRN
Qty: 6 TABLET | Refills: 0 | Status: SHIPPED | OUTPATIENT
Start: 2021-08-11

## 2021-08-11 RX ORDER — NITROFURANTOIN 25; 75 MG/1; MG/1
100 CAPSULE ORAL 2 TIMES DAILY
Qty: 14 CAPSULE | Refills: 0 | Status: SHIPPED | OUTPATIENT
Start: 2021-08-11 | End: 2021-08-18

## 2021-08-11 NOTE — PATIENT INSTRUCTIONS
Dysuria  Dysuria is when you have pain during urination. It is often described as a burning feeling. Learn more about this problem and how it can be treated. Painful urination (dysuria) is often caused by a problem in the urinary tract.    What causes from the vagina or penis  · Rash or joint pain  · Increased back or belly pain  · Enlarged painful lymph nodes (lumps) in the groin  Denny last reviewed this educational content on 4/1/2019  © 5440-1609 [employer]. All rights reserved.  Kelsy Pickett one from someone else, from a toilet seat, or from sharing a bath. The most common cause of bladder infections is bacteria from the bowels. The bacteria get onto the skin around the opening of the urethra. From there, they can get into the urine.  Then the correctly after going to the bathroom. Wipe from front to back after using the toilet. This helps prevent the spread of bacteria. · Urinate more often. Don't try to hold urine in for a long time. · Wear loose-fitting clothes and cotton underwear.  Don't w

## 2021-08-11 NOTE — PROGRESS NOTES
CHIEF COMPLAINT:   No chief complaint on file. HPI:   Yunior Yepez is a 36year old female who presents with symptoms of UTI. Complaining of urinary frequency, urgency, dysuria for last 3 days. Symptoms have been worsening since onset.   Treatments tobacco: Never Used    Alcohol use: No    Drug use: No        REVIEW OF SYSTEMS:   GENERAL: Denies fever, chills, or body aches; good appetite  SKIN: no rashes, no skin wounds or ulcers. GI: See HPI. No N/V/D.  Reports chronic constipation- last BM 1 week are no Patient Instructions on file for this visit. The patient indicates understanding of these issues and agrees to the plan. The patient is asked to return in 3 days if not better. Call if fever, vomiting, worsening symptoms.

## 2021-12-30 ENCOUNTER — APPOINTMENT (OUTPATIENT)
Dept: SURGERY | Age: 41
End: 2021-12-30

## 2022-01-13 ENCOUNTER — APPOINTMENT (OUTPATIENT)
Dept: SURGERY | Age: 42
End: 2022-01-13

## 2022-01-14 ENCOUNTER — APPOINTMENT (OUTPATIENT)
Dept: SURGERY | Age: 42
End: 2022-01-14

## 2022-01-20 ENCOUNTER — TELEPHONE (OUTPATIENT)
Dept: SCHEDULING | Age: 42
End: 2022-01-20

## 2022-01-21 ENCOUNTER — TELEPHONE (OUTPATIENT)
Dept: SURGERY | Age: 42
End: 2022-01-21

## 2022-01-26 ENCOUNTER — TELEPHONE (OUTPATIENT)
Dept: SURGERY | Age: 42
End: 2022-01-26

## 2022-01-27 ENCOUNTER — OFFICE VISIT (OUTPATIENT)
Dept: SURGERY | Age: 42
End: 2022-01-27

## 2022-01-27 VITALS
HEIGHT: 64 IN | OXYGEN SATURATION: 98 % | DIASTOLIC BLOOD PRESSURE: 62 MMHG | TEMPERATURE: 98.6 F | BODY MASS INDEX: 21.17 KG/M2 | HEART RATE: 118 BPM | SYSTOLIC BLOOD PRESSURE: 120 MMHG | WEIGHT: 124 LBS

## 2022-01-27 DIAGNOSIS — L73.2 HIDRADENITIS SUPPURATIVA: Primary | ICD-10-CM

## 2022-01-27 PROCEDURE — 99243 OFF/OP CNSLTJ NEW/EST LOW 30: CPT | Performed by: SURGERY

## 2022-01-27 RX ORDER — BUTALBITAL/ACETAMINOPHEN 50MG-325MG
50-325 TABLET ORAL
COMMUNITY
Start: 2017-02-03 | End: 2022-10-06 | Stop reason: DRUGHIGH

## 2022-02-01 ENCOUNTER — TELEPHONE (OUTPATIENT)
Dept: SURGERY | Age: 42
End: 2022-02-01

## 2022-02-01 DIAGNOSIS — L73.2 HIDRADENITIS SUPPURATIVA: Primary | ICD-10-CM

## 2022-02-02 ENCOUNTER — HOSPITAL ENCOUNTER (OUTPATIENT)
Dept: CT IMAGING | Age: 42
End: 2022-02-02
Attending: SURGERY

## 2022-05-19 ENCOUNTER — ORDER TRANSCRIPTION (OUTPATIENT)
Dept: PHYSICAL THERAPY | Facility: HOSPITAL | Age: 42
End: 2022-05-19

## 2022-05-19 DIAGNOSIS — M54.41 CHRONIC LOW BACK PAIN WITH BILATERAL SCIATICA: Primary | ICD-10-CM

## 2022-05-19 DIAGNOSIS — M54.42 CHRONIC LOW BACK PAIN WITH BILATERAL SCIATICA: Primary | ICD-10-CM

## 2022-05-19 DIAGNOSIS — G89.29 CHRONIC LOW BACK PAIN WITH BILATERAL SCIATICA: Primary | ICD-10-CM

## 2022-06-02 ENCOUNTER — APPOINTMENT (OUTPATIENT)
Dept: SURGERY | Age: 42
End: 2022-06-02

## 2022-06-20 ENCOUNTER — OFFICE VISIT (OUTPATIENT)
Dept: PHYSICAL THERAPY | Age: 42
End: 2022-06-20
Payer: MEDICAID

## 2022-06-20 DIAGNOSIS — G89.29 CHRONIC LOW BACK PAIN WITH BILATERAL SCIATICA: ICD-10-CM

## 2022-06-20 DIAGNOSIS — M54.42 CHRONIC LOW BACK PAIN WITH BILATERAL SCIATICA: ICD-10-CM

## 2022-06-20 DIAGNOSIS — M54.41 CHRONIC LOW BACK PAIN WITH BILATERAL SCIATICA: ICD-10-CM

## 2022-06-20 PROCEDURE — 97161 PT EVAL LOW COMPLEX 20 MIN: CPT | Performed by: PHYSICAL THERAPIST

## 2022-06-20 PROCEDURE — 97110 THERAPEUTIC EXERCISES: CPT | Performed by: PHYSICAL THERAPIST

## 2022-06-20 PROCEDURE — 97530 THERAPEUTIC ACTIVITIES: CPT | Performed by: PHYSICAL THERAPIST

## 2022-06-20 NOTE — PROGRESS NOTES
SPINE EVALUATION:     Diagnosis:   Chronic low back pain with bilateral sciatica (M54.41,M54.42,G89.29)   Referring Provider: OG Solis Martinezberg, [place]  Phone: [phone]  Fax: [phone] Date of Evaluation:    6/20/2022    Precautions:  sinus tachycardia Next MD visit:   none scheduled  Date of Surgery: n/a    Insurance Authorization: 9 visits ( 36 units) through 8/19/2022 #[id number]   Patient's significant other accompanies her during PT Evaluation/session. PATIENT SUMMARY   Elisa Wisdom is a 39year old female who presents to therapy today with complaints of  LBP and R LE pain starting ~ 1 year ago. She c/o low back, L buttock pain, with pain radiating down R  LE into bottom of foot. Pt c/o increased symptoms with laying down, sitting too long, sweeping house, bowel movement. She reports 1 episode of L posterior thigh symtpoms. She reports relief with laying on back, leaning forward. Pt denies any previous interventions. Recently consulted OG at [address]: PT referral.  Currently takes Valium, Norco for pain; previously prescribed for migraines, TMJ issues. Social: unemployed    Pt describes pain level current 5/10, at best 5/10, at worst 8/10. Current functional limitations include:  laying down, sitting too long, sweeping house, bowel movement. Julianna Gustafson describes prior level of function: chronic LB/R LE c/o for ~ 1 year. Pt goals include: improvement, no pain  Past medical history was reviewed with Julianna Gustafson.  Significant findings include:   Esophageal dysphagia 8/1/2019   Odynophagia 8/1/2019   Arthralgia of right temporomandibular joint 5/1/2019   Vitamin D insufficiency 12/5/2016   Tobacco use disorder 6/14/2016   Anxiety 6/14/2016   Sinus tachycardia 6/14/2016   Migraines 5/3/2016   Surgeries: 2 c sections    Pt denies diplopia, dysarthria, dysphasia, dizziness, drop attacks, bowel/bladder changes, saddle anesthesia, and NEFTALY LE N/T.    Dany Feng presents to physical therapy evaluation with primary c/o LBP, L buttock pain, R LE pain. The results of the objective tests and measures show: + R SLR, lumbar AROM associated with pain, + c/o lumbar paraspinals. Functional deficits include but are not limited to:  laying down, sitting too long, sweeping house, bowel movement. Signs and symptoms are consistent with diagnosis of chronic LBP with R LE sciatica. Pt and PT discussed evaluation findings, pathology, POC and HEP. Pt voiced understanding and performs HEP correctly without reported pain. Skilled Physical Therapy is medically necessary to address the above impairments and reach functional goals. OBJECTIVE:   Observation/Posture: Standing: no apparent scoliosis  Neuro Screen: Negative L/R SLUMP;  + R SLR, negative L    Lumbar AROM: (* denotes performed with pain)  Flexion: WNL  Extension: WNL* R LBP  Sidebending: R WNL* L LBP; L WNL  Rotation: R WNL*; L WNL*    Accessory motion: not tested  Palpation: + lumbar paraspinals    Strength: (* denotes performed with pain)  LE   Hip abduction: R 4+/5; L 4+/5  Hip Extension: R 4+/5; L 4+/5   Hip ER: R 5-/5; L 5-/5  Knee Flexion: R 5/5; L 5/5   Knee extension (L3): R 5/5; L 5/5   DF (L4): R 5/5; L 5/5       Flexibility:   LE   Hamstrings: R/L restriction       Gait: pt ambulates on level ground with reduced lumbar rotation, [facility] Treatment and Response:   Pt education was provided on exam findings, treatment diagnosis, treatment plan, expectations, and prognosis.  Pt was also provided recommendations for bodymechanics, sitting posture, limit sitting itme  Patient was instructed in and issued a HEP for: Seated or supine L/R sciatic nerve glides, seated or supine figure 4 stretch, prone contralateral hip ext/shoulder flex, prone on elbows or gentle standing lumbar ext   Charges: PT Eval Low Complexity, 1 TE, 1 TA      Total Timed Treatment: 25 min     Total Treatment Time: 45 min Based on clinical rationale and outcome measures, this evaluation involved Low Complexity decision making. PLAN OF CARE:    Goals: (to be met in 8 -9 visits)   1. Patient to report independence with PT HEP To facilitate self management and to maintain progress made in PT.   2. Patient to report knowledge of proper bodymechanics and sitting posture in order to reduce risk for LBP. 3. Patient to have 5/5 gross hip/lumbar MMT for improved spinal stabilization and improved tolerance for household chores. 4. Patient to report 40% improvement in LBP/R LE c/o to facilitate ADL. 5. Patient to use pillow support in order to sleep 5 hours undisturbed by LBP/R LE c/o. Frequency / Duration: Patient will be seen for 2 x/week or a total of 8-9 visits over a 90 day period. Treatment will include: Gait training, Manual Therapy, Neuromuscular Re-education, Self-Care Home Management, Therapeutic Activities, Therapeutic Exercise, Home Exercise Program instruction and Modalities to include: Electrical stimulation (unattended), Ultrasound and MHP, cold pack    Education or treatment limitation: None  Rehab Potential:fair - good    FOTO: 94.05    Patient/Family/Caregiver was advised of these findings, precautions, and treatment options and has agreed to actively participate in planning and for this course of care. Thank you for your referral. Please co-sign or sign and return this letter via fax as soon as possible to [phone]. If you have any questions, please contact me at Dept: [phone]    Sincerely,  Electronically signed by therapist: Mitali Muhammad, PT    Physician's certification required: Yes  I certify the need for these services furnished under this plan of treatment and while under my care.     X___________________________________________________ Date____________________    Certification From: 5/83/4838  To:9/18/2022

## 2022-06-22 ENCOUNTER — OFFICE VISIT (OUTPATIENT)
Dept: PHYSICAL THERAPY | Age: 42
End: 2022-06-22
Payer: MEDICAID

## 2022-06-22 DIAGNOSIS — G89.29 CHRONIC LOW BACK PAIN WITH BILATERAL SCIATICA: ICD-10-CM

## 2022-06-22 DIAGNOSIS — M54.42 CHRONIC LOW BACK PAIN WITH BILATERAL SCIATICA: ICD-10-CM

## 2022-06-22 DIAGNOSIS — M54.41 CHRONIC LOW BACK PAIN WITH BILATERAL SCIATICA: ICD-10-CM

## 2022-06-22 PROCEDURE — 97110 THERAPEUTIC EXERCISES: CPT | Performed by: PHYSICAL THERAPIST

## 2022-06-22 NOTE — PROGRESS NOTES
Diagnosis:    Chronic low back pain with bilateral sciatica (M54.41,M54.42,G89.29)   Referring Provider:  OG Seo Martinezberg, [address]  Phone: [phone]  Fax: [phone] Date of Evaluation:    6/20/2022    Precautions:  sinus tachycardia Next MD visit:   none scheduled  Date of Surgery: n/a   Insurance Primary/Secondary: Akanksha Mercado / N/A    # Auth Visits:       9 visits ( 36 units) through 8/19/2022 #[id number]    Subjective: C/o R LE pain today, deny LBP since I took Norco and Ibuprofen today. Pain: 4-5/10      Objective:   Palpation: + increased tone/c/o B gluteals      Assessment: Pt with continued c/o chronic LBP, R LE sciatica. Tardy arrival limited session content. Pt tolerated session well, c/o no worse at close of PT session. Goals:   (to be met in 8 -9 visits)   1. Patient to report independence with PT HEP To facilitate self management and to maintain progress made in PT.   2. Patient to report knowledge of proper bodymechanics and sitting posture in order to reduce risk for LBP. 3. Patient to have 5/5 gross hip/lumbar MMT for improved spinal stabilization and improved tolerance for household chores. 4. Patient to report 40% improvement in LBP/R LE c/o to facilitate ADL. 5. Patient to use pillow support in order to sleep 5 hours undisturbed by LBP/R LE c/o. Plan: Continue PT 2 x weekly for 8-9 visits. Review abdominal exercises, per pt inquiry. Progress lumbar spine ROM, piriformis stretches, lumbar/hip stabilization. Date: 6/22/2022  TX#: 2/8 - Pt arrived ~ 20 minutes late to PT session  Date:                 TX#: 3/ Date:                 TX#: 4/ Date:                 TX#: 5/ Date: Tx#: 6/   There Ex:   HEP review/pratice; see below.    Supine and seated figure 4 stretches x 20 sec each L/R - verbal/visual cuing for from  Bodymechanics review for sweeping and vacuuming  Bridge with ball for adductor isometric 2  x 10 reps   LTR 5 x 5 sec each  Reviewed abdominal strengthening considerations due to pt inquiry- avoid crunches and crunches in rotation at this time due to pt reporting c/o increased LBP with these exercises; will discuss abdominal stabilization exercises in future       Manual:   STM/DTM L/R gluteals                     HEP: Seated or supine L/R sciatic nerve glides, seated or supine figure 4 stretch, prone contralateral hip ext/shoulder flex, prone on elbows or gentle standing lumbar ext     Charges: 2 TE       Total Timed Treatment: 25 min  Total Treatment Time: 28 min

## 2022-06-23 ENCOUNTER — OFFICE VISIT (OUTPATIENT)
Dept: SURGERY | Age: 42
End: 2022-06-23

## 2022-06-23 VITALS
DIASTOLIC BLOOD PRESSURE: 99 MMHG | HEART RATE: 96 BPM | BODY MASS INDEX: 21.61 KG/M2 | SYSTOLIC BLOOD PRESSURE: 150 MMHG | WEIGHT: 126.6 LBS | HEIGHT: 64 IN | TEMPERATURE: 96.6 F

## 2022-06-23 DIAGNOSIS — L73.2 HIDRADENITIS SUPPURATIVA: Primary | ICD-10-CM

## 2022-06-23 PROCEDURE — 99213 OFFICE O/P EST LOW 20 MIN: CPT | Performed by: SURGERY

## 2022-06-23 RX ORDER — SULFAMETHOXAZOLE AND TRIMETHOPRIM 800; 160 MG/1; MG/1
1 TABLET ORAL 2 TIMES DAILY
Qty: 14 TABLET | Refills: 0 | Status: SHIPPED | OUTPATIENT
Start: 2022-06-23 | End: 2022-10-06 | Stop reason: ALTCHOICE

## 2022-06-23 RX ORDER — DIAZEPAM 10 MG/1
10 TABLET ORAL
COMMUNITY
Start: 2022-05-15

## 2022-06-23 RX ORDER — BUTALBITAL, ACETAMINOPHEN AND CAFFEINE 50; 325; 40 MG/1; MG/1; MG/1
1 TABLET ORAL EVERY 6 HOURS PRN
COMMUNITY
Start: 2022-06-18

## 2022-06-27 ENCOUNTER — OFFICE VISIT (OUTPATIENT)
Dept: PHYSICAL THERAPY | Age: 42
End: 2022-06-27
Payer: MEDICAID

## 2022-06-27 DIAGNOSIS — G89.29 CHRONIC LOW BACK PAIN WITH BILATERAL SCIATICA: ICD-10-CM

## 2022-06-27 DIAGNOSIS — M54.42 CHRONIC LOW BACK PAIN WITH BILATERAL SCIATICA: ICD-10-CM

## 2022-06-27 DIAGNOSIS — M54.41 CHRONIC LOW BACK PAIN WITH BILATERAL SCIATICA: ICD-10-CM

## 2022-06-27 PROCEDURE — 97110 THERAPEUTIC EXERCISES: CPT | Performed by: PHYSICAL THERAPIST

## 2022-06-27 NOTE — PROGRESS NOTES
Diagnosis:    Chronic low back pain with bilateral sciatica (M54.41,M54.42,G89.29)   Referring Provider:  OG Guzman Martinezberg, [address]  Phone: [phone]  Fax: [phone] Date of Evaluation:    6/20/2022    Precautions:  sinus tachycardia Next MD visit:   none scheduled  Date of Surgery: n/a   Insurance Primary/Secondary: Roger Uriostegui / N/A    # Auth Visits:       9 visits ( 36 units) through 8/19/2022 #[id number]    Subjective:  C/o gross L LE/foot pain after riding around in car yesterday; reduced with massage. C/o increased LB/LE c/o on Friday; perhaps related to polishing toenails. Currently c/o LBP, deny LE c/o today. Taking Norco and Ibuprofen daily. C/o R LE numbness with intimacy; have appointment with gynecologist tomorrow. Pain: 4/10      Objective:   Palpation: + increased tone/c/o B gluteals      Assessment:  Pt with continued c/o chronic LBP, R LE sciatica. Pt's c/o no worse at close of PT session. Pt with limited progress thus far. Pt to consult her gynecologist tomorrow re: c/o R LE numbness with sexual intercourse. Goals:   (to be met in 8 -9 visits)   1. Patient to report independence with PT HEP To facilitate self management and to maintain progress made in PT.   2. Patient to report knowledge of proper bodymechanics and sitting posture in order to reduce risk for LBP. 3. Patient to have 5/5 gross hip/lumbar MMT for improved spinal stabilization and improved tolerance for household chores. 4. Patient to report 40% improvement in LBP/R LE c/o to facilitate ADL. 5. Patient to use pillow support in order to sleep 5 hours undisturbed by LBP/R LE c/o. Plan: Continue PT 2 x weekly for 8-9 visits. Progress lumbar spine ROM, piriformis stretches, lumbar/hip stabilization.    Date: 6/22/2022  TX#: 2/8 - Pt arrived ~ 20 minutes late to PT session  Date:   6/27/2022             TX#: 3/8 Date:                 TX#: 4/ Date:                 TX#: 5/ Date:   Tx#: 6/   There Ex:   HEP review/pratice; see below. Supine and seated figure 4 stretches x 20 sec each L/R - verbal/visual cuing for from  Bodymechanics review for sweeping and vacuuming  Bridge with ball for adductor isometric 2  x 10 reps   LTR 5 x 5 sec each  Reviewed abdominal strengthening considerations due to pt inquiry- avoid crunches and crunches in rotation at this time due to pt reporting c/o increased LBP with these exercises; will discuss abdominal stabilization exercises in future There Ex:   Quadruped cat/cow spinal mobilization x 5 reps  S/L L/R hip ER x 20 reps - progress next session  TA lumbar stabilization x 3 reps - tactile/verbal cuing; hips/knees 90/90 with alternate toe taps x 5 reps, x 10 reps  Bridge with ball for adductor isometric 2  x 10 reps   LTR - pt c/o LBP, stopped  Seated L/R figure 4 stretch 2 x 20 sec each  Standing lumbar ext x 5 reps  Thread the needle at wall x 5 reps each L/R  HEP review/progresison; see below.        Manual:   STM/DTM L/R gluteals Manual:   STM/DTM L/R gluteals, ITB                    HEP: Seated or supine L/R sciatic nerve glides, seated or supine figure 4 stretch, prone contralateral hip ext/shoulder flex, prone on elbows or gentle standing lumbar ext, TA lumbar stabilization      Charges: 3 TE     Total Timed Treatment: 40 min  Total Treatment Time: 45 min

## 2022-06-28 ENCOUNTER — OFFICE VISIT (OUTPATIENT)
Dept: OBGYN | Age: 42
End: 2022-06-28

## 2022-06-28 ENCOUNTER — APPOINTMENT (OUTPATIENT)
Dept: OBGYN | Age: 42
End: 2022-06-28

## 2022-06-28 VITALS
WEIGHT: 128.09 LBS | HEART RATE: 92 BPM | TEMPERATURE: 97.7 F | HEIGHT: 64 IN | BODY MASS INDEX: 21.87 KG/M2 | DIASTOLIC BLOOD PRESSURE: 82 MMHG | SYSTOLIC BLOOD PRESSURE: 124 MMHG | OXYGEN SATURATION: 100 %

## 2022-06-28 DIAGNOSIS — Z01.419 WELL WOMAN EXAM WITH ROUTINE GYNECOLOGICAL EXAM: Primary | ICD-10-CM

## 2022-06-28 DIAGNOSIS — Z30.09 GENERAL COUNSELING AND ADVICE ON FEMALE CONTRACEPTION: ICD-10-CM

## 2022-06-28 DIAGNOSIS — Z12.31 ENCOUNTER FOR SCREENING MAMMOGRAM FOR MALIGNANT NEOPLASM OF BREAST: ICD-10-CM

## 2022-06-28 DIAGNOSIS — Z12.4 CERVICAL CANCER SCREENING: ICD-10-CM

## 2022-06-28 PROCEDURE — 88175 CYTOPATH C/V AUTO FLUID REDO: CPT | Performed by: INTERNAL MEDICINE

## 2022-06-28 PROCEDURE — 87801 DETECT AGNT MULT DNA AMPLI: CPT | Performed by: INTERNAL MEDICINE

## 2022-06-28 PROCEDURE — 99386 PREV VISIT NEW AGE 40-64: CPT | Performed by: OBSTETRICS & GYNECOLOGY

## 2022-06-28 PROCEDURE — 87624 HPV HI-RISK TYP POOLED RSLT: CPT | Performed by: INTERNAL MEDICINE

## 2022-06-28 RX ORDER — BUTALBITAL, ASPIRIN AND CAFFEINE 50; 325; 40 MG/1; MG/1; MG/1
TABLET ORAL
COMMUNITY
End: 2022-10-06 | Stop reason: DRUGHIGH

## 2022-06-28 RX ORDER — DIAZEPAM 5 MG/1
TABLET ORAL
COMMUNITY
End: 2022-10-06

## 2022-06-28 RX ORDER — SULFAMETHOXAZOLE AND TRIMETHOPRIM 200; 40 MG/5ML; MG/5ML
SUSPENSION ORAL
COMMUNITY
End: 2022-10-06 | Stop reason: ALTCHOICE

## 2022-06-29 LAB
C TRACH RRNA SPEC QL NAA+PROBE: NEGATIVE
Lab: NORMAL
N GONORRHOEA RRNA SPEC QL NAA+PROBE: NEGATIVE

## 2022-06-30 ENCOUNTER — TELEPHONE (OUTPATIENT)
Dept: PHYSICAL THERAPY | Facility: HOSPITAL | Age: 42
End: 2022-06-30

## 2022-06-30 ENCOUNTER — APPOINTMENT (OUTPATIENT)
Dept: PHYSICAL THERAPY | Age: 42
End: 2022-06-30
Payer: MEDICAID

## 2022-06-30 ENCOUNTER — TELEPHONE (OUTPATIENT)
Dept: PHYSICAL THERAPY | Age: 42
End: 2022-06-30

## 2022-06-30 NOTE — TELEPHONE ENCOUNTER
Called patient re: tomorrow's 2:30pm PT appointment. Stated that I have several openings earlier in the day, if she would like to attend earlier. Advised that she call  if interested in attending earlier to determine appointment times/availability.  If 2:30 preferred, stated that I would see her at 2:30pm.

## 2022-07-01 ENCOUNTER — OFFICE VISIT (OUTPATIENT)
Dept: PHYSICAL THERAPY | Age: 42
End: 2022-07-01
Payer: MEDICAID

## 2022-07-01 DIAGNOSIS — M54.41 CHRONIC LOW BACK PAIN WITH BILATERAL SCIATICA: ICD-10-CM

## 2022-07-01 DIAGNOSIS — G89.29 CHRONIC LOW BACK PAIN WITH BILATERAL SCIATICA: ICD-10-CM

## 2022-07-01 DIAGNOSIS — M54.42 CHRONIC LOW BACK PAIN WITH BILATERAL SCIATICA: ICD-10-CM

## 2022-07-01 LAB
CASE RPRT: NORMAL
CLINICAL INFO: NORMAL
CYTOLOGY CVX/VAG STUDY: NORMAL
HPV16+18+45 E6+E7MRNA CVX NAA+PROBE: NEGATIVE
Lab: NORMAL
PAP EDUCATIONAL NOTE: NORMAL
SPECIMEN ADEQUACY: NORMAL

## 2022-07-01 PROCEDURE — 97110 THERAPEUTIC EXERCISES: CPT | Performed by: PHYSICAL THERAPIST

## 2022-07-01 PROCEDURE — 97140 MANUAL THERAPY 1/> REGIONS: CPT | Performed by: PHYSICAL THERAPIST

## 2022-07-01 NOTE — PROGRESS NOTES
Diagnosis:    Chronic low back pain with bilateral sciatica (M54.41,M54.42,G89.29)   Referring Provider:  Inocente Schwab, APRN, [facility]  Phone: [phone]  Fax: [phone] Date of Evaluation:    6/20/2022    Precautions:  sinus tachycardia Next MD visit:   none scheduled  Date of Surgery: n/a   Insurance Primary/Secondary: [facility] / N/A    # Auth Visits:       9 visits ( 36 units) through 8/19/2022 #[id number]    Subjective:  Have had a migraine for past 2 days, unable to attend PT, just have to lay in bed which makes my back/LE worse. I took 2 Hydrocodone, 4 Motrin, and 2 valium for c/o, and I will had pain. Have been under the care of neurologist for migraines - currently take medication Hydrocodone and another medication. .  Less consistent pain after last PT session but past 2 days have set me back. Saw gynecologist; she said that my LBP/R LE numbness is related to my back and not gynecological.  Pain:  3-4/10      Objective:   Palpation: + increased tone/c/o B gluteals, R lumbar paraspinals  Gait, transfers: Pt independent, non-antalgic      Assessment:   Pt with limited progress thus far with PT. Pt reported c/o 5/10 at close of session. Advised that pt consult physician re: medication use/dogeage; see Subjective. Consider referral back to physician/APRN if no further consistent progress, for chronic pain, and for medication management/education. Goals:   (to be met in 8 -9 visits)   1. Patient to report independence with PT HEP To facilitate self management and to maintain progress made in PT.   2. Patient to report knowledge of proper bodymechanics and sitting posture in order to reduce risk for LBP. 3. Patient to have 5/5 gross hip/lumbar MMT for improved spinal stabilization and improved tolerance for household chores. 4. Patient to report 40% improvement in LBP/R LE c/o to facilitate ADL.    5. Patient to use pillow support in order to sleep 5 hours undisturbed by LBP/R LE c/o. Plan: Continue PT 2 x weekly for 8-9 visits. Progress lumbar spine ROM, piriformis stretches, lumbar/hip stabilization. See Assessment. Date: 6/22/2022  TX#: 2/8 - Pt arrived ~ 20 minutes late to PT session  Date:   6/27/2022             TX#: 3/8 Date:    7/1/2022             TX#: 4/8 - Pt arrived ~ 15 min late. Date:                 TX#: 5/ Date: Tx#: 6/   There Ex:   HEP review/pratice; see below. Supine and seated figure 4 stretches x 20 sec each L/R - verbal/visual cuing for from  Bodymechanics review for sweeping and vacuuming  Bridge with ball for adductor isometric 2  x 10 reps   LTR 5 x 5 sec each  Reviewed abdominal strengthening considerations due to pt inquiry- avoid crunches and crunches in rotation at this time due to pt reporting c/o increased LBP with these exercises; will discuss abdominal stabilization exercises in future There Ex:   Quadruped cat/cow spinal mobilization x 5 reps  S/L L/R hip ER x 20 reps - progress next session  TA lumbar stabilization x 3 reps - tactile/verbal cuing; hips/knees 90/90 with alternate toe taps x 5 reps, x 10 reps  Bridge with ball for adductor isometric 2  x 10 reps   LTR - pt c/o LBP, stopped  Seated L/R figure 4 stretch 2 x 20 sec each  Standing lumbar ext x 5 reps  Thread the needle at wall x 5 reps each L/R  HEP review/progresison; see below. There Ex:   Standing lumbar ext x 5 reps - pt denies change in LBP/LE c/o  S/L L/R hip ER RTB x 15 reps each L/R  Bridge with ball for adductor isometric 2  x 10 reps   Supine L/R sciatic nerve glides 5 x 5 sec each  Seated L/R figure 4 stretch  x 20 sec each  Thread the needle at wall x 5 reps each L/R  HEP review; see below.       Manual:   STM/DTM L/R gluteals Manual:   STM/DTM L/R gluteals, ITB Manual:   STM/DTM L/R gluteals, ITB  STM L/R ITB using foam roller                   HEP: Seated or supine L/R sciatic nerve glides, seated or supine figure 4 stretch, prone contralateral hip ext/shoulder flex, gentle standing lumbar ext, TA lumbar stabilization      Charges: 2 TE, 1 manual    Total Timed Treatment: 40 min  Total Treatment Time: 45 min

## 2022-07-06 ENCOUNTER — TELEPHONE (OUTPATIENT)
Dept: PHYSICAL THERAPY | Facility: HOSPITAL | Age: 42
End: 2022-07-06

## 2022-07-06 ENCOUNTER — APPOINTMENT (OUTPATIENT)
Dept: PHYSICAL THERAPY | Age: 42
End: 2022-07-06
Payer: MEDICAID

## 2022-07-08 ENCOUNTER — APPOINTMENT (OUTPATIENT)
Dept: PHYSICAL THERAPY | Age: 42
End: 2022-07-08
Payer: MEDICAID

## 2022-07-08 ENCOUNTER — TELEPHONE (OUTPATIENT)
Dept: PHYSICAL THERAPY | Age: 42
End: 2022-07-08

## 2022-07-08 NOTE — TELEPHONE ENCOUNTER
Called patient at 3:05pm re: no show to 2:30pm PT appointment. Pt stated that she had arrived, was in waiting area. Patient requested to cancel today's session given only 10 min remained of session and that her insurance had only authorized 9 visits. Pt requested to reschedule missed sessions. Rescheduled patient per her request, reviewed attendance policy and advised if going to be more than 15 min late to PT appointment, to contact PT Dept, that it may be best to reschedule. Pt expressed understanding. Provided pt with printout of PT appointments and confirmed 7/11 PT appointment.
Patient

## 2022-07-11 ENCOUNTER — OFFICE VISIT (OUTPATIENT)
Dept: PHYSICAL THERAPY | Age: 42
End: 2022-07-11
Payer: MEDICAID

## 2022-07-11 DIAGNOSIS — M54.42 CHRONIC LOW BACK PAIN WITH BILATERAL SCIATICA: ICD-10-CM

## 2022-07-11 DIAGNOSIS — G89.29 CHRONIC LOW BACK PAIN WITH BILATERAL SCIATICA: ICD-10-CM

## 2022-07-11 DIAGNOSIS — M54.41 CHRONIC LOW BACK PAIN WITH BILATERAL SCIATICA: ICD-10-CM

## 2022-07-11 PROCEDURE — 97140 MANUAL THERAPY 1/> REGIONS: CPT | Performed by: PHYSICAL THERAPIST

## 2022-07-11 PROCEDURE — 97110 THERAPEUTIC EXERCISES: CPT | Performed by: PHYSICAL THERAPIST

## 2022-07-11 NOTE — PROGRESS NOTES
Diagnosis:   Chronic low back pain with bilateral sciatica (M54.41,M54.42,G89.29)   Referring Provider:  OG Cm Martinezberg, [address]  Phone: [phone]  Fax: [phone] Date of Evaluation:    6/20/2022    Precautions:  sinus tachycardia Next MD visit:   none scheduled  Date of Surgery: n/a   Insurance Primary/Secondary: [facility] / N/A    # Auth Visits:       9 visits ( 36 units) through 8/19/2022 #[id number]    Subjective:  Tired today. Having a mirgaine; related to my menstrual cycle, continue to take medication for migraines. My back is really good right now. Since started PT, increased ease with brushing teeth, have been able to do more housework. Exercises helped me the other day when I was in pain. Partial HEP compliance. Saw gynecologist; she said that my LBP/R LE numbness is related to my back and not gynecological.  Pain:  1.5/10      Objective:   Gait, transfers: Pt independent, non-antalgic    Lumbar AROM: (* denotes performed with pain)  Flexion: hands to shins * c/o stiffness/tightness  Extension: WNL  Sidebending: R WNL* LBP; L WNL  Rotation: R WNL,WNL    Assessment:   Pt with fair Pt attendance. Pt tolerated session well without c/o until reassessed lumbar flexion AROM (in slight knee flexion). Pt report reported 4.5/10 LBP after then. Completed lumbar ext and sustained lumbar ext/press up following; pt denied any change in c/o. Will monitor pt for consistent progress and refer back to physician/APRN should she not progress. Pt stated that APRN who referred her to PT advised that she will likely need surgery but that she will try PT first. Pt reports regular hydrocodone use for LBP. Goals:   (to be met in 8 -9 visits)   1. Patient to report independence with PT HEP To facilitate self management and to maintain progress made in PT.   2. Patient to report knowledge of proper bodymechanics and sitting posture in order to reduce risk for LBP.    3. Patient to have 5/5 gross hip/lumbar MMT for improved spinal stabilization and improved tolerance for household chores. 4. Patient to report 40% improvement in LBP/R LE c/o to facilitate ADL. 5. Patient to use pillow support in order to sleep 5 hours undisturbed by LBP/R LE c/o. Plan: Continue PT 2 x weekly for 8-9 visits. F/u on LBP. See Assessment. Date: 6/22/2022  TX#: 2/8 - Pt arrived ~ 20 minutes late to PT session  Date:   6/27/2022             TX#: 3/8 Date:    7/1/2022             TX#: 4/8 - Pt arrived ~ 15 min late. Date:  7/11/2022               TX#: 5/8 Date: Tx#: 6/ There Ex:   HEP review/pratice; see below. Supine and seated figure 4 stretches x 20 sec each L/R - verbal/visual cuing for from  Bodymechanics review for sweeping and vacuuming  Bridge with ball for adductor isometric 2  x 10 reps   LTR 5 x 5 sec each  Reviewed abdominal strengthening considerations due to pt inquiry- avoid crunches and crunches in rotation at this time due to pt reporting c/o increased LBP with these exercises; will discuss abdominal stabilization exercises in future There Ex:   Quadruped cat/cow spinal mobilization x 5 reps  S/L L/R hip ER x 20 reps - progress next session  TA lumbar stabilization x 3 reps - tactile/verbal cuing; hips/knees 90/90 with alternate toe taps x 5 reps, x 10 reps  Bridge with ball for adductor isometric 2  x 10 reps   LTR - pt c/o LBP, stopped  Seated L/R figure 4 stretch 2 x 20 sec each  Standing lumbar ext x 5 reps  Thread the needle at wall x 5 reps each L/R  HEP review/progresison; see below. There Ex:   Standing lumbar ext x 5 reps - pt denies change in LBP/LE c/o  S/L L/R hip ER RTB x 15 reps each L/R  Bridge with ball for adductor isometric 2  x 10 reps   Supine L/R sciatic nerve glides 5 x 5 sec each  Seated L/R figure 4 stretch  x 20 sec each  Thread the needle at wall x 5 reps each L/R  HEP review; see below.   There Ex:   S/L L/R hip ER RTB x 30 reps each L/R  Bridge with ball for adductor isometric 2  x 10 reps   Supine figure 4 stretch with slight rotation L/R x 20 sec each  Sit - stand with RTB proximal to knees x 10 reps  Standing L/R 3 way hamstring stretches using staircase  x 10 sec each  Standing L/R hip flexor/gastroc stretch using staircase x 20 sec each  Lateral steps with RTB prox to knees x ~ 40 feet  Thread the needle at wall x 5 reps each L/R  Standing lumbar ext x 5 reps  Sustained lumbar ext/prone on elbows x 30 sec  HEP review; see below.     Manual:   STM/DTM L/R gluteals Manual:   STM/DTM L/R gluteals, ITB Manual:   STM/DTM L/R gluteals, ITB  STM L/R ITB using foam roller Manual:   STM/DTM L/R gluteals, ITB  STM L/R ITB using foam roller                  HEP: Seated or supine L/R sciatic nerve glides, seated or supine figure 4 stretch, prone contralateral hip ext/shoulder flex, gentle standing lumbar ext, TA lumbar stabilization      Charges: 2 TE, 1 manual    Total Timed Treatment: 45 min  Total Treatment Time: 45 min

## 2022-07-13 ENCOUNTER — APPOINTMENT (OUTPATIENT)
Dept: PHYSICAL THERAPY | Age: 42
End: 2022-07-13
Payer: MEDICAID

## 2022-07-13 ENCOUNTER — TELEPHONE (OUTPATIENT)
Dept: PHYSICAL THERAPY | Age: 42
End: 2022-07-13

## 2022-07-13 NOTE — TELEPHONE ENCOUNTER
Called patient re: no show to today's PT session. Reviewed PT Attendance Policy, that pt has had 2+ no shows, late cancellations which are criteria for discharge. Pt stated that she does not have time for PT now, requested to cancel remaining PT sessions, and that she will call to schedule PT in August. Advised that her insurance PT auth expires 8/19/2022, that she would need to return to PT prior to then. Patient expressed understanding.

## 2022-08-28 ENCOUNTER — HOSPITAL ENCOUNTER (EMERGENCY)
Facility: HOSPITAL | Age: 42
Discharge: HOME | End: 2022-08-28
Attending: EMERGENCY MEDICINE
Payer: MEDICAID

## 2022-08-28 VITALS
TEMPERATURE: 98 F | DIASTOLIC BLOOD PRESSURE: 93 MMHG | BODY MASS INDEX: 20.83 KG/M2 | RESPIRATION RATE: 18 BRPM | SYSTOLIC BLOOD PRESSURE: 137 MMHG | WEIGHT: 125 LBS | OXYGEN SATURATION: 98 % | HEIGHT: 65 IN | HEART RATE: 78 BPM

## 2022-08-28 DIAGNOSIS — G43.009 ATYPICAL MIGRAINE: Primary | ICD-10-CM

## 2022-08-28 PROCEDURE — 96374 THER/PROPH/DIAG INJ IV PUSH: CPT

## 2022-08-28 PROCEDURE — 99284 EMERGENCY DEPT VISIT MOD MDM: CPT

## 2022-08-28 PROCEDURE — 96361 HYDRATE IV INFUSION ADD-ON: CPT

## 2022-08-28 PROCEDURE — 96375 TX/PRO/DX INJ NEW DRUG ADDON: CPT

## 2022-08-28 RX ORDER — DEXAMETHASONE SODIUM PHOSPHATE 4 MG/ML
10 VIAL (ML) INJECTION ONCE
Status: COMPLETED | OUTPATIENT
Start: 2022-08-28 | End: 2022-08-28

## 2022-08-28 RX ORDER — KETOROLAC TROMETHAMINE 30 MG/ML
30 INJECTION, SOLUTION INTRAMUSCULAR; INTRAVENOUS ONCE
Status: COMPLETED | OUTPATIENT
Start: 2022-08-28 | End: 2022-08-28

## 2022-08-28 NOTE — ED INITIAL ASSESSMENT (HPI)
Patient arrives complaining of a severe headache that started at 1700 yesterday. +photophobia, +audiophobia. Patient slurring her words and complaining of right sided facial numbness but this has happened with her previous migraines. Strength equal bilaterally. Face symmetry.

## 2022-10-05 ENCOUNTER — TELEPHONE (OUTPATIENT)
Dept: SURGERY | Age: 42
End: 2022-10-05

## 2022-10-06 ENCOUNTER — OFFICE VISIT (OUTPATIENT)
Dept: SURGERY | Age: 42
End: 2022-10-06

## 2022-10-06 VITALS
SYSTOLIC BLOOD PRESSURE: 142 MMHG | HEART RATE: 101 BPM | BODY MASS INDEX: 21.85 KG/M2 | HEIGHT: 64 IN | TEMPERATURE: 97.6 F | DIASTOLIC BLOOD PRESSURE: 97 MMHG | WEIGHT: 128 LBS

## 2022-10-06 DIAGNOSIS — L72.3 INFECTED SEBACEOUS CYST OF SKIN: ICD-10-CM

## 2022-10-06 DIAGNOSIS — L73.2 HIDRADENITIS SUPPURATIVA: Primary | ICD-10-CM

## 2022-10-06 DIAGNOSIS — L08.9 INFECTED SEBACEOUS CYST OF SKIN: ICD-10-CM

## 2022-10-06 DIAGNOSIS — L72.3 INFECTED SEBACEOUS CYST OF SKIN: Primary | ICD-10-CM

## 2022-10-06 DIAGNOSIS — L08.9 INFECTED SEBACEOUS CYST OF SKIN: Primary | ICD-10-CM

## 2022-10-06 PROCEDURE — 99213 OFFICE O/P EST LOW 20 MIN: CPT | Performed by: SURGERY

## 2022-10-06 RX ORDER — SULFAMETHOXAZOLE AND TRIMETHOPRIM 800; 160 MG/1; MG/1
1 TABLET ORAL 2 TIMES DAILY
Qty: 14 TABLET | Refills: 0 | Status: SHIPPED | OUTPATIENT
Start: 2022-10-06 | End: 2022-10-13

## 2022-10-07 ENCOUNTER — HOSPITAL ENCOUNTER (OUTPATIENT)
Age: 42
End: 2022-10-07
Attending: SURGERY | Admitting: SURGERY

## 2022-10-07 ENCOUNTER — PREP FOR CASE (OUTPATIENT)
Dept: SURGERY | Age: 42
End: 2022-10-07

## 2022-10-07 DIAGNOSIS — L08.9 INFECTED SEBACEOUS CYST OF SKIN: Primary | ICD-10-CM

## 2022-10-07 DIAGNOSIS — L72.3 INFECTED SEBACEOUS CYST OF SKIN: Primary | ICD-10-CM

## 2022-10-14 ENCOUNTER — TELEPHONE (OUTPATIENT)
Dept: SURGERY | Age: 42
End: 2022-10-14

## 2022-10-17 ENCOUNTER — TELEPHONE (OUTPATIENT)
Dept: SURGERY | Age: 42
End: 2022-10-17

## 2022-10-17 DIAGNOSIS — L08.9 INFECTED SEBACEOUS CYST OF SKIN: Primary | ICD-10-CM

## 2022-10-17 DIAGNOSIS — L72.3 INFECTED SEBACEOUS CYST OF SKIN: Primary | ICD-10-CM

## 2022-10-17 RX ORDER — SULFAMETHOXAZOLE AND TRIMETHOPRIM 800; 160 MG/1; MG/1
1 TABLET ORAL 2 TIMES DAILY
Qty: 14 TABLET | Refills: 0 | Status: SHIPPED | OUTPATIENT
Start: 2022-10-17 | End: 2022-10-24

## 2022-10-27 ENCOUNTER — OFFICE VISIT (OUTPATIENT)
Dept: SURGERY | Age: 42
End: 2022-10-27

## 2022-10-27 ENCOUNTER — PREP FOR CASE (OUTPATIENT)
Dept: SURGERY | Age: 42
End: 2022-10-27

## 2022-10-27 ENCOUNTER — HOSPITAL ENCOUNTER (OUTPATIENT)
Age: 42
End: 2022-10-27
Attending: SURGERY | Admitting: SURGERY

## 2022-10-27 VITALS
BODY MASS INDEX: 21.85 KG/M2 | HEIGHT: 64 IN | SYSTOLIC BLOOD PRESSURE: 136 MMHG | DIASTOLIC BLOOD PRESSURE: 90 MMHG | HEART RATE: 100 BPM | WEIGHT: 128 LBS

## 2022-10-27 DIAGNOSIS — L08.9 INFECTED SEBACEOUS CYST OF SKIN: Primary | ICD-10-CM

## 2022-10-27 DIAGNOSIS — L72.3 INFECTED SEBACEOUS CYST OF SKIN: Primary | ICD-10-CM

## 2022-10-27 DIAGNOSIS — L73.2 HIDRADENITIS SUPPURATIVA: ICD-10-CM

## 2022-10-27 PROCEDURE — 99213 OFFICE O/P EST LOW 20 MIN: CPT | Performed by: SURGERY

## 2022-11-04 ENCOUNTER — TELEPHONE (OUTPATIENT)
Dept: SURGERY | Age: 42
End: 2022-11-04

## 2022-11-04 DIAGNOSIS — L08.9 INFECTED SEBACEOUS CYST OF SKIN: Primary | ICD-10-CM

## 2022-11-04 DIAGNOSIS — L72.3 INFECTED SEBACEOUS CYST OF SKIN: Primary | ICD-10-CM

## 2022-11-04 RX ORDER — SULFAMETHOXAZOLE AND TRIMETHOPRIM 800; 160 MG/1; MG/1
1 TABLET ORAL 2 TIMES DAILY
Qty: 14 TABLET | Refills: 0 | Status: SHIPPED | OUTPATIENT
Start: 2022-11-04 | End: 2022-11-11

## 2022-11-17 ENCOUNTER — APPOINTMENT (OUTPATIENT)
Dept: SURGERY | Age: 42
End: 2022-11-17

## 2022-11-29 ENCOUNTER — TELEPHONE (OUTPATIENT)
Dept: SURGERY | Age: 42
End: 2022-11-29

## 2022-12-05 ENCOUNTER — TELEPHONE (OUTPATIENT)
Dept: SURGERY | Age: 42
End: 2022-12-05

## 2023-02-08 ENCOUNTER — OFFICE VISIT (OUTPATIENT)
Dept: PHYSICAL MEDICINE AND REHAB | Facility: CLINIC | Age: 43
End: 2023-02-08
Payer: MEDICAID

## 2023-02-08 VITALS
HEART RATE: 82 BPM | HEIGHT: 65 IN | SYSTOLIC BLOOD PRESSURE: 130 MMHG | DIASTOLIC BLOOD PRESSURE: 85 MMHG | OXYGEN SATURATION: 100 % | BODY MASS INDEX: 22.82 KG/M2 | WEIGHT: 137 LBS

## 2023-02-08 DIAGNOSIS — M54.16 BILATERAL LUMBAR RADICULOPATHY: Primary | ICD-10-CM

## 2023-02-08 PROCEDURE — 3079F DIAST BP 80-89 MM HG: CPT | Performed by: PHYSICAL MEDICINE & REHABILITATION

## 2023-02-08 PROCEDURE — 99204 OFFICE O/P NEW MOD 45 MIN: CPT | Performed by: PHYSICAL MEDICINE & REHABILITATION

## 2023-02-08 PROCEDURE — 3075F SYST BP GE 130 - 139MM HG: CPT | Performed by: PHYSICAL MEDICINE & REHABILITATION

## 2023-02-08 PROCEDURE — 3008F BODY MASS INDEX DOCD: CPT | Performed by: PHYSICAL MEDICINE & REHABILITATION

## 2023-02-08 RX ORDER — METHYLPREDNISOLONE 4 MG/1
TABLET ORAL
Qty: 1 EACH | Refills: 0 | Status: SHIPPED | OUTPATIENT
Start: 2023-02-08

## 2023-02-08 RX ORDER — GABAPENTIN 300 MG/1
CAPSULE ORAL
Qty: 90 CAPSULE | Refills: 0 | Status: SHIPPED | OUTPATIENT
Start: 2023-02-08

## 2023-02-08 RX ORDER — HYDROCODONE BITARTRATE AND ACETAMINOPHEN 5; 325 MG/1; MG/1
1 TABLET ORAL 2 TIMES DAILY PRN
Qty: 14 TABLET | Refills: 0 | Status: SHIPPED | OUTPATIENT
Start: 2023-02-08

## 2023-02-08 NOTE — PATIENT INSTRUCTIONS
-MRI of the lumbar spine and follow up after  -Norco 5mg twice daily for severe pain  -Medrol dose pack to be started today  -Gabapentin 300mg three times daily  -Will discuss possible injection after imaging

## 2023-02-14 ENCOUNTER — OFFICE VISIT (OUTPATIENT)
Dept: OBGYN | Age: 43
End: 2023-02-14

## 2023-02-14 VITALS
BODY MASS INDEX: 23.41 KG/M2 | SYSTOLIC BLOOD PRESSURE: 151 MMHG | DIASTOLIC BLOOD PRESSURE: 104 MMHG | WEIGHT: 137.13 LBS | HEART RATE: 99 BPM | HEIGHT: 64 IN | TEMPERATURE: 97.7 F

## 2023-02-14 DIAGNOSIS — L29.2 VULVAR ITCHING: Primary | ICD-10-CM

## 2023-02-14 PROCEDURE — 81513 NFCT DS BV RNA VAG FLU ALG: CPT | Performed by: INTERNAL MEDICINE

## 2023-02-14 PROCEDURE — 99214 OFFICE O/P EST MOD 30 MIN: CPT | Performed by: OBSTETRICS & GYNECOLOGY

## 2023-02-14 PROCEDURE — 87661 TRICHOMONAS VAGINALIS AMPLIF: CPT | Performed by: INTERNAL MEDICINE

## 2023-02-14 PROCEDURE — 87563 M. GENITALIUM AMP PROBE: CPT | Performed by: INTERNAL MEDICINE

## 2023-02-14 PROCEDURE — 87481 CANDIDA DNA AMP PROBE: CPT | Performed by: INTERNAL MEDICINE

## 2023-02-14 RX ORDER — BUTALBITAL, ACETAMINOPHEN AND CAFFEINE 50; 325; 40 MG/1; MG/1; MG/1
CAPSULE ORAL
COMMUNITY
End: 2023-02-14 | Stop reason: SDUPTHER

## 2023-02-14 RX ORDER — LORATADINE 10 MG/1
1 TABLET ORAL DAILY
COMMUNITY

## 2023-02-14 RX ORDER — CLOBETASOL PROPIONATE 0.5 MG/G
OINTMENT TOPICAL 2 TIMES DAILY
Qty: 45 G | Refills: 0 | Status: SHIPPED | OUTPATIENT
Start: 2023-02-14 | End: 2023-02-28

## 2023-02-14 RX ORDER — HYDROCODONE BITARTRATE AND ACETAMINOPHEN 10; 325 MG/1; MG/1
1 TABLET ORAL 3 TIMES DAILY PRN
COMMUNITY
Start: 2023-01-06 | End: 2023-02-14 | Stop reason: SDUPTHER

## 2023-02-14 RX ORDER — GLYCOPYRROLATE 2 MG/1
1 TABLET ORAL DAILY
COMMUNITY

## 2023-02-14 RX ORDER — METHYLPREDNISOLONE 4 MG/1
TABLET ORAL
COMMUNITY
Start: 2023-02-08

## 2023-02-14 RX ORDER — CLOTRIMAZOLE AND BETAMETHASONE DIPROPIONATE 10; .64 MG/G; MG/G
CREAM TOPICAL
COMMUNITY
Start: 2022-09-19

## 2023-02-14 RX ORDER — ONDANSETRON 4 MG/1
TABLET, FILM COATED ORAL
COMMUNITY

## 2023-02-14 RX ORDER — CELECOXIB 100 MG/1
CAPSULE ORAL
COMMUNITY
Start: 2023-01-06

## 2023-02-14 RX ORDER — GABAPENTIN 300 MG/1
CAPSULE ORAL
COMMUNITY
Start: 2023-02-08 | End: 2023-02-14 | Stop reason: SDUPTHER

## 2023-02-14 RX ORDER — DICYCLOMINE HYDROCHLORIDE 10 MG/1
10 CAPSULE ORAL 3 TIMES DAILY PRN
COMMUNITY
Start: 2022-10-31 | End: 2023-02-14 | Stop reason: SDUPTHER

## 2023-02-14 RX ORDER — GABAPENTIN 300 MG/1
CAPSULE ORAL
COMMUNITY
Start: 2023-02-08

## 2023-02-14 RX ORDER — METHYLPREDNISOLONE 4 MG/1
TABLET ORAL
COMMUNITY
Start: 2023-02-08 | End: 2023-02-14 | Stop reason: SDUPTHER

## 2023-02-14 RX ORDER — LORATADINE 10 MG/1
10 TABLET ORAL DAILY PRN
COMMUNITY
Start: 2022-10-31

## 2023-02-14 RX ORDER — HYDROCODONE BITARTRATE AND ACETAMINOPHEN 5; 325 MG/1; MG/1
1 TABLET ORAL
COMMUNITY
Start: 2023-02-08

## 2023-02-14 RX ORDER — SUMATRIPTAN 100 MG/1
TABLET, FILM COATED ORAL EVERY 24 HOURS
COMMUNITY

## 2023-02-14 ASSESSMENT — PAIN SCALES - GENERAL: PAINLEVEL: 0

## 2023-02-15 LAB
BACTERIAL VAGINOSIS VAG-IMP: NOT DETECTED
C ALBICANS DNA VAG QL NAA+PROBE: NOT DETECTED
C GLABRATA DNA VAG QL NAA+PROBE: NOT DETECTED
M GENITALIUM DNA SPEC QL NAA+PROBE: NOT DETECTED
SERVICE CMNT-IMP: NORMAL
T VAGINALIS DNA VAG QL NAA+PROBE: NOT DETECTED

## 2023-02-22 ENCOUNTER — APPOINTMENT (OUTPATIENT)
Dept: OBGYN | Age: 43
End: 2023-02-22

## 2023-02-24 ENCOUNTER — OFFICE VISIT (OUTPATIENT)
Dept: OBGYN | Age: 43
End: 2023-02-24

## 2023-02-24 VITALS
SYSTOLIC BLOOD PRESSURE: 145 MMHG | HEIGHT: 64 IN | WEIGHT: 138.67 LBS | BODY MASS INDEX: 23.67 KG/M2 | HEART RATE: 99 BPM | TEMPERATURE: 98 F | DIASTOLIC BLOOD PRESSURE: 100 MMHG

## 2023-02-24 DIAGNOSIS — Z30.430 ENCOUNTER FOR IUD INSERTION: Primary | ICD-10-CM

## 2023-02-24 LAB
B-HCG UR QL: NEGATIVE
INTERNAL PROCEDURAL CONTROLS ACCEPTABLE: YES
TEST LOT EXPIRATION DATE: NORMAL
TEST LOT NUMBER: NORMAL

## 2023-02-24 PROCEDURE — 58300 INSERT INTRAUTERINE DEVICE: CPT | Performed by: OBSTETRICS & GYNECOLOGY

## 2023-02-24 PROCEDURE — 81025 URINE PREGNANCY TEST: CPT | Performed by: OBSTETRICS & GYNECOLOGY

## 2023-02-24 ASSESSMENT — PAIN SCALES - GENERAL: PAINLEVEL: 0

## 2023-03-07 ENCOUNTER — TELEPHONE (OUTPATIENT)
Dept: PHYSICAL MEDICINE AND REHAB | Facility: CLINIC | Age: 43
End: 2023-03-07

## 2023-03-07 ENCOUNTER — TELEMEDICINE (OUTPATIENT)
Dept: PHYSICAL MEDICINE AND REHAB | Facility: CLINIC | Age: 43
End: 2023-03-07
Payer: MEDICAID

## 2023-03-07 DIAGNOSIS — M54.16 BILATERAL LUMBAR RADICULOPATHY: Primary | ICD-10-CM

## 2023-03-07 PROCEDURE — 99214 OFFICE O/P EST MOD 30 MIN: CPT | Performed by: PHYSICAL MEDICINE & REHABILITATION

## 2023-03-07 RX ORDER — DIAZEPAM 10 MG/1
10 TABLET ORAL ONCE
Qty: 1 TABLET | Refills: 0 | Status: SHIPPED | OUTPATIENT
Start: 2023-03-07 | End: 2023-03-07

## 2023-03-07 NOTE — TELEPHONE ENCOUNTER
Patient has been scheduled for Bilateral L5 TFESIs on 03/10/2023 at the  [facility] with Dr. Zeynep Johnson. -Anesthesia type: LOCAL. -If receiving MAC or IVC sedation patient will need to get COVID tested 3 days prior even if already vaccinated (order placed by [facility].)  -If scheduling [address] [facility] covid testing required for all procedures whether patient is vaccinated or not. -Patient informed not to eat or drink anything after midnight the night prior to the procedure, if being sedated. -Patient was advised that if he/she does receive the covid vaccine it needs to be at least 2 weeks before or after the injection. -Medications and allergies reviewed. -Patient reminded to hold NSAIDs (Ibuprofen, ASA 81, Aleve, Naproxen, Mobic etc.) for 3 days prior to [place]  if BMI is greater than 35. For Cervical injections only hold multivitamins, Vitamin E, Fish Oil, Phentermine (Lomaira) for 7 days prior to injection and NSAIDS.  mg to be held for 7 days prior to injections.  -If patient is receiving MAC/IVCS Phentermine Cyndee Messi) will need to be held for 7 days prior to injection.  -If on blood thinner clearance has been received to hold this medication by provider.   -Patient informed he/she will need a  to and from procedure. -[facility] is located in the [facility] 1st floor. Patient may park in the yellow parking.       Patient verbalized understanding and agrees wit h plan.  -----> Scheduled in Epic: Yes  -----> Scheduled in [employer]: N/A

## 2023-03-07 NOTE — TELEPHONE ENCOUNTER
Initiated authorization for Bilateral L5 TFESIs CPT [id number] dx:M54.16 to be done at [facility] with [employer]  Status: Approved w/ authorization #[id number] valid 3/7/23-5/6/23

## 2023-03-10 NOTE — TELEPHONE ENCOUNTER
Pt calling to cancel her injection pt stating she has had a horrible migraine for the past 2 days and is out of her migraine medication so it is likely to not get better by the time of her appointment. Will send cancellation order to Yuridia at [facility] scheduling. Pt will call back when she is feeling better to reschedule her injection.

## 2023-03-28 ENCOUNTER — TELEPHONE (OUTPATIENT)
Dept: OBGYN | Age: 43
End: 2023-03-28

## 2023-03-30 ENCOUNTER — TELEPHONE (OUTPATIENT)
Dept: OBGYN | Age: 43
End: 2023-03-30

## 2023-08-15 ENCOUNTER — OFFICE VISIT (OUTPATIENT)
Dept: OBGYN | Age: 43
End: 2023-08-15

## 2023-08-15 VITALS
OXYGEN SATURATION: 98 % | BODY MASS INDEX: 23.69 KG/M2 | DIASTOLIC BLOOD PRESSURE: 97 MMHG | HEART RATE: 105 BPM | SYSTOLIC BLOOD PRESSURE: 149 MMHG | WEIGHT: 138.78 LBS | HEIGHT: 64 IN

## 2023-08-15 DIAGNOSIS — Z30.432 ENCOUNTER FOR IUD REMOVAL: Primary | ICD-10-CM

## 2023-08-15 DIAGNOSIS — L30.9 VULVAR DERMATITIS: ICD-10-CM

## 2023-08-15 PROCEDURE — 99213 OFFICE O/P EST LOW 20 MIN: CPT | Performed by: OBSTETRICS & GYNECOLOGY

## 2023-08-15 PROCEDURE — 58301 REMOVE INTRAUTERINE DEVICE: CPT | Performed by: OBSTETRICS & GYNECOLOGY

## 2023-08-15 RX ORDER — CLOBETASOL PROPIONATE 0.5 MG/G
OINTMENT TOPICAL 2 TIMES DAILY
Qty: 45 G | Refills: 0 | Status: SHIPPED | OUTPATIENT
Start: 2023-08-15 | End: 2023-08-29

## 2024-06-28 ENCOUNTER — HOSPITAL ENCOUNTER (OUTPATIENT)
Age: 44
Discharge: HOME | End: 2024-06-28

## 2024-06-28 VITALS
DIASTOLIC BLOOD PRESSURE: 81 MMHG | OXYGEN SATURATION: 96 % | SYSTOLIC BLOOD PRESSURE: 131 MMHG | TEMPERATURE: 98 F | RESPIRATION RATE: 19 BRPM | HEART RATE: 96 BPM

## 2024-06-28 DIAGNOSIS — L56.8 PHOTOSENSITIVITY DERMATITIS: Primary | ICD-10-CM

## 2024-06-28 PROCEDURE — 99213 OFFICE O/P EST LOW 20 MIN: CPT

## 2024-06-28 RX ORDER — PREDNISONE 20 MG/1
40 TABLET ORAL DAILY
Qty: 10 TABLET | Refills: 0 | Status: SHIPPED | OUTPATIENT
Start: 2024-06-28 | End: 2024-07-03

## 2024-06-28 RX ORDER — HYDROXYZINE HYDROCHLORIDE 25 MG/1
TABLET, FILM COATED ORAL EVERY 4 HOURS PRN
Qty: 20 TABLET | Refills: 0 | Status: SHIPPED | OUTPATIENT
Start: 2024-06-28 | End: 2024-07-28

## 2024-06-28 NOTE — ED INITIAL ASSESSMENT (HPI)
Rash since Wednesday night. Started at both feet and has been traveling up to legs and bilateral arms. Reports mild itching. Taking benadryl w/ some relief, last dose 1100.

## 2024-06-28 NOTE — ED PROVIDER NOTES
Patient Seen in: [facility]      History     Chief Complaint   Patient presents with    Rash     Stated Complaint: Rash  Subjective:   Tiana is a 43-year-old female presenting to the immediate care complaining of a rash.  Patient states that the rash started on her legs and is now progressively moving to her arms and trunk.  Patient states that she has not had any exposures to any new foods, soaps, lotions, medications.  She has not been working in her garden or outside.  Patient states that she did lie in a tanning sanchez the day before the rash started.  Patient denies any difficulty swallowing or speaking.  She has not had any shortness of breath.  No fever.  No recent travel.  Patient denies any other concerns or complaints.        Objective:   Past Medical History:    Anxiety    Back problem    Esophageal reflux    Essential hypertension    Migraine            Past Surgical History:   Procedure Laterality Date      2004      2005              Social History     Socioeconomic History    Marital status:    Tobacco Use    Smoking status: Every Day     Current packs/day: 1.00     Average packs/day: 1 pack/day for 27.0 years (27.0 ttl pk-yrs)     Types: Cigarettes    Smokeless tobacco: Never   Substance and Sexual Activity    Alcohol use: No    Drug use: No   Other Topics Concern    Caffeine Concern Yes     Comment: soda,coffee   Social History Narrative    ** Merged History Encounter **          Social Determinants of Health     Financial Resource Strain: Low Risk  (10/5/2023)    Received from [facility]    Overall Financial Resource Strain (CARDIA)     Difficulty of Paying Living Expenses: Not hard at all   Food Insecurity: No Food Insecurity (10/5/2023)    Received from [facility]    Hunger Vital Sign     Worried About Running Out of Food in the Last Year: Never true     Ran Out of Food in the Last Year: Never  true   Transportation Needs: No Transportation Needs (10/5/2023)    Received from [facility]    PRAPARE - Transportation     Lack of Transportation (Medical): No     Lack of Transportation (Non-Medical): No    Received from [facility]    Social Connections   Housing Stability: Low Risk  (10/5/2023)    Received from [facility]    Housing Stability Vital Sign     Unable to Pay for Housing in the Last Year: No     Number of Places Lived in the Last Year: 0     Unstable Housing in the Last Year: No            Review of Systems    Positive for stated complaint: Rash    Other systems are as noted in HPI.  Constitutional and vital signs reviewed.      All other systems reviewed and negative except as noted above.    Physical Exam     ED Triage Vitals [06/28/24 1521]   /81   Pulse 96   Resp 19   Temp 97.8 °F (36.6 °C)   Temp src Temporal   SpO2 96 %   O2 Device None (Room air)     Current:/81   Pulse 96   Temp 97.8 °F (36.6 °C) (Temporal)   Resp 19   SpO2 96%     Physical Exam  Vitals and nursing note reviewed.   Constitutional:       General: She is not in acute distress.     Appearance: Normal appearance. She is not ill-appearing, toxic-appearing or diaphoretic.   HENT:      Head: Normocephalic.   Cardiovascular:      Rate and Rhythm: Normal rate.   Pulmonary:      Effort: Pulmonary effort is normal.   Musculoskeletal:         General: Normal range of motion.      Cervical back: Normal range of motion.   Skin:     General: Skin is warm and dry.      Capillary Refill: Capillary refill takes less than 2 seconds.      Comments: Erythematous macular papular rash to arms, legs, feet, abdomen and lower back.  No crusting, scaling, peeling.  No drainage.  No rash to the face.    Neurological:      General: No focal deficit present.      Mental Status: She is alert and oriented to person, place, and time.   Psychiatric:         Mood and Affect:  Mood normal.         Behavior: Behavior normal.         Thought Content: Thought content normal.         Judgment: Judgment normal.         ED Course   Radiology:  No results found.  Labs Reviewed - No data to display    MDM     Medical Decision Making  Differential diagnoses reflecting the complexity of care include but are not limited to dermatitis, allergic reaction, photosensitivity, impetigo.    Comorbidities that add complexity to management include: Hypertension  History obtained by an independent source was from: Patient  Patient is well appearing, non-toxic and in no acute distress.  Vital signs are stable.   Patient's history and physical exam are consistent with dermatitis, presumably caused by photosensitivity from lying in tanning sanchez and taking losartan/hydrochlorothiazide.    No respiratory complaints or distress.  No difficulty or painful swallowing, no swelling to lips, mouth or tongue.  No recent travel.    Prescription sent for prednisone.  Recommended that patient take Zyrtec or Claritin during the day for itching.  Sent a prescription for Atarax with drowsiness precautions for itching.  Recommended that patient use Aquaphor and cortisone to skin.  Recommended that if the patient develops throat pain, difficulty swallowing, shortness of breath, worsening or concerning symptoms they should go to the emergency department.  Recommended that if the rash does not resolve within the next few days the patient should follow-up with a dermatologist.  Otherwise recommended follow-up with primary care provider.    ED precautions discussed.  Patient (guardian) advised to follow up with PCP in 2-3 days.  Patient (guardian) agrees with this plan of care.  Patient (guardian) verbalizes understanding of discharge instructions and plan of care.      Risk  OTC drugs.  Prescription drug management.        Disposition and Plan     Clinical Impression:  1. Photosensitivity dermatitis          Disposition:  Discharge  6/28/2024  3:46 pm    Follow-up:  Danny Warren MD  [address] [facility] [address]  [phone]          [facility]  [address]  [facility] [address] [phone]        [place]  [address]  [facility] [address]              Medications Prescribed:  Discharge Medication List as of 6/28/2024  3:46 PM        START taking these medications    Details   predniSONE 20 MG Oral Tab Take 2 tablets (40 mg total) by mouth daily for 5 days., Normal, Disp-10 tablet, R-0      hydrOXYzine 25 MG Oral Tab Take 1-2 tablets (25-50 mg total) by mouth every 4 (four) hours as needed for Itching., Normal, Disp-20 tablet, R-0

## 2024-06-28 NOTE — DISCHARGE INSTRUCTIONS
This rash appears to be due to the reaction of your losartan/hydrochlorothiazide and UV/sun exposure.  Please take the prednisone as prescribed.    You can also take Zyrtec or Claritin during the day for itching.  I also sent you a prescription for Atarax for the itching, this will make you tired similar to Benadryl.  You can buy Aquaphor and hydrocortisone over-the-counter and mix them together and put them on your skin for itching.  If you develop any throat pain, shortness of breath, worsening or concerning symptoms you should go to the emergency department.    If the rash does not resolve within the next few days you should follow-up with a dermatologist.    Otherwise follow-up with your primary care doctor.

## 2024-11-24 ENCOUNTER — HOSPITAL ENCOUNTER (OUTPATIENT)
Age: 44
Discharge: HOME | End: 2024-11-24
Payer: MEDICAID

## 2024-11-24 VITALS
OXYGEN SATURATION: 99 % | HEART RATE: 106 BPM | RESPIRATION RATE: 16 BRPM | TEMPERATURE: 98 F | SYSTOLIC BLOOD PRESSURE: 132 MMHG | DIASTOLIC BLOOD PRESSURE: 93 MMHG

## 2024-11-24 DIAGNOSIS — L81.8 DISORDER OF SKIN DUE TO TATTOO INK: Primary | ICD-10-CM

## 2024-11-24 DIAGNOSIS — L98.9 DISORDER OF SKIN DUE TO TATTOO INK: Primary | ICD-10-CM

## 2024-11-24 PROCEDURE — 99213 OFFICE O/P EST LOW 20 MIN: CPT | Performed by: NURSE PRACTITIONER

## 2024-11-24 RX ORDER — MUPIROCIN 20 MG/G
1 OINTMENT TOPICAL 3 TIMES DAILY
Qty: 22 G | Refills: 0 | Status: SHIPPED | OUTPATIENT
Start: 2024-11-24 | End: 2024-12-01

## 2024-11-24 RX ORDER — SULFAMETHOXAZOLE AND TRIMETHOPRIM 800; 160 MG/1; MG/1
1 TABLET ORAL 2 TIMES DAILY
Qty: 14 TABLET | Refills: 0 | Status: SHIPPED | OUTPATIENT
Start: 2024-11-24 | End: 2024-12-01

## 2024-11-24 NOTE — DISCHARGE INSTRUCTIONS
Apply the topical ointment 3 times a day.  You may continue to use the ointment recommended by your .  Apply ice.  If worsening symptoms start Bactrim.  Schedule recheck with your primary doctor

## 2024-11-24 NOTE — ED PROVIDER NOTES
Patient Seen in: [facility]      History     Chief Complaint   Patient presents with    Skin Problem     Stated Complaint: Skin problem  Subjective:   44-year-old female with hypertension presents from home.  Patient is here with concern for a tattoo infection. States she had shamrock tattooed to her left wrist about a week ago.  States it has been painful since.  Several days ago she developed redness.  States area is tender.  No drainage.  Some dry skin.  She has been applying topical aquatat as recommended by her .  She is a smoker    The history is provided by the patient. No  was used.     Objective:   Past Medical History:    Anxiety    Back problem    Esophageal reflux    Essential hypertension    Migraine            Past Surgical History:   Procedure Laterality Date      2004      2005              Social History     Socioeconomic History    Marital status:    Tobacco Use    Smoking status: Every Day     Current packs/day: 1.00     Average packs/day: 1 pack/day for 27.0 years (27.0 ttl pk-yrs)     Types: Cigarettes    Smokeless tobacco: Never   Substance and Sexual Activity    Alcohol use: No    Drug use: No   Other Topics Concern    Caffeine Concern Yes     Comment: soda,coffee   Social History Narrative    ** Merged History Encounter **          Social Drivers of Health     Financial Resource Strain: Low Risk  (10/5/2023)    Received from [facility]    Overall Financial Resource Strain (CARDIA)     Difficulty of Paying Living Expenses: Not hard at all   Food Insecurity: No Food Insecurity (10/5/2023)    Received from [facility]    Hunger Vital Sign     Worried About Running Out of Food in the Last Year: Never true     Ran Out of Food in the Last Year: Never true   Transportation Needs: No Transportation Needs (10/5/2023)    Received from [facility]     PRAPARE - Transportation     Lack of Transportation (Medical): No     Lack of Transportation (Non-Medical): No    Received from [facility]    Social Connections   Housing Stability: Low Risk  (10/5/2023)    Received from [facility]    Housing Stability Vital Sign     Unable to Pay for Housing in the Last Year: No     Number of Places Lived in the Last Year: 0     Unstable Housing in the Last Year: No            Review of Systems    Positive for stated complaint: Skin Problem    Other systems are as noted in HPI.  Constitutional and vital signs reviewed.      All other systems reviewed and negative except as noted above.    Physical Exam     ED Triage Vitals [11/24/24 1522]   BP (!) 132/93   Pulse 106   Resp 16   Temp 97.5 °F (36.4 °C)   Temp src Temporal   SpO2 99 %   O2 Device None (Room air)     Current:BP (!) 132/93   Pulse 106   Temp 97.5 °F (36.4 °C) (Temporal)   Resp 16   SpO2 99%     Physical Exam  Vitals and nursing note reviewed.   Constitutional:       General: She is not in acute distress.     Appearance: Normal appearance. She is not ill-appearing or toxic-appearing.   HENT:      Head: Normocephalic and atraumatic.      Nose: Nose normal.      Mouth/Throat:      Mouth: Mucous membranes are moist.      Pharynx: Oropharynx is clear.   Eyes:      Pupils: Pupils are equal, round, and reactive to light.   Cardiovascular:      Rate and Rhythm: Normal rate and regular rhythm.      Pulses: Normal pulses.   Pulmonary:      Effort: Pulmonary effort is normal. No respiratory distress.      Breath sounds: Normal breath sounds.      Comments: Lungs clear.  No adventitious lung sounds.  No distress.  No hypoxia.  Pulse ox 99% ra. Which is normal    Abdominal:      General: Abdomen is flat.      Palpations: Abdomen is soft.   Musculoskeletal:         General: No signs of injury. Normal range of motion.      Cervical back: Normal range of motion and neck supple.   Skin:      General: Skin is warm and dry.      Capillary Refill: Capillary refill takes less than 2 seconds.      Comments: Manuel tattoo to left lateral wrist.  Mild erythema around the periphery.  Tender to touch.  No streaking.  No fluctuance.  No drainage.  Skin is dry and peeling.   Neurological:      General: No focal deficit present.      Mental Status: She is alert and oriented to person, place, and time.      GCS: GCS eye subscore is 4. GCS verbal subscore is 5. GCS motor subscore is 6.   Psychiatric:         Mood and Affect: Mood normal.         Behavior: Behavior normal.         Thought Content: Thought content normal.         Judgment: Judgment normal.         ED Course   Radiology:  No results found.  Labs Reviewed - No data to display    MDM     Medical Decision Making  Differential diagnoses reflecting the complexity of care include: Tattoo, tattoo reaction, cellulitis  Patient with erythema and pain surrounding a recent tattoo.  Concern for early infection.  Nontoxic-appearing on exam.  No significant cellulitis.  No abscess.  No fever.  No drainage.  No lymphangina.  Discussed oral versus topical antibiotics.  Patient would prefer topical antibiotics.  Wound care discussed.  Keep the tattoo moist    Plan of Care: Mupirocin.  May start Bactrim if worsening symptoms )has Ceclor allergy).  Continue tattoo ointment.  Wound check with primary doctor    Results and plan of care discussed with the patient/family. They are in agreement with discharge. They understand to follow up with their primary doctor or the referral physician for further evaluation, especially if no improvement.  Also discussed the limitations of immediate care, patient is aware that if symptoms are worse they should go to the emergency room. Verbal and written discharge instructions were given.     My independent interpretation of studies of: N/A  Diagnostic tests and medications considered but not ordered were: No indication for imaging or  labs today  Shared decision making was done by: Patient would prefer topical treatment  Comorbidities that add complexity to management include: Hypertension, smoking  External chart review was done and was noted: Reviewed, noncontributory  History obtained by an independent source was from: N/A  Discussions and management was done with: N/A  Social determinants of health that affect care: N/A              Problems Addressed:  Disorder of skin due to tattoo ink: acute illness or injury    Risk  OTC drugs.  Prescription drug management.        Disposition and Plan     Clinical Impression:  1. Disorder of skin due to tattoo ink         Disposition:  Discharge  11/24/2024  3:28 pm    Follow-up:  Danny Warren MD  [address] [facility] [address]  [phone]                Medications Prescribed:  Discharge Medication List as of 11/24/2024  3:29 PM

## (undated) NOTE — LETTER
Date & Time: 2/27/2019, 7:32 AM  Patient: Sukhwinder Goldsteino    Dear Sukhwinder Richard,    We are unable to reach you by phone and would like to follow up with you regarding your visit to the Emergency Department.         Please contact the Emergency Department

## (undated) NOTE — ED AVS SNAPSHOT
Lonney Merlin   MRN: [id number]    Department:  [facility] Emergency Department   Date of Visit:  8/21/2017           Disclosure     Insurance plans vary and the physician(s) referred by the ER may not be covered by your plan.  Please contact y CARE PHYSICIAN AT ONCE OR RETURN IMMEDIATELY TO THE EMERGENCY DEPARTMENT. If you have been prescribed any medication(s), please fill your prescription right away and begin taking the medication(s) as directed.   If you believe that any of the medications

## (undated) NOTE — LETTER
Patient Name: Julio Biswas  YOB: 1980          MRN number:  [id number]  Date:  6/20/2022  Referring Physician: Tera Oconnell         SPINE EVALUATION:     Diagnosis:   Chronic low back pain with bilateral sciatica (M54.41,M54.42,G89.29)   Referring Provider: OG You Martinezberg, [address]  Phone: [phone]  Fax: [phone] Date of Evaluation:    6/20/2022    Precautions:  sinus tachycardia Next MD visit:   none scheduled  Date of Surgery: n/a    Insurance Authorization: 9 visits ( 36 units) through 8/19/2022 #[id number]   Patient's significant other accompanies her during PT Evaluation/session. PATIENT SUMMARY   Julio Biswas is a 39year old female who presents to therapy today with complaints of  LBP and R LE pain starting ~ 1 year ago. She c/o low back, L buttock pain, with pain radiating down R  LE into bottom of foot. Pt c/o increased symptoms with laying down, sitting too long, sweeping house, bowel movement. She reports 1 episode of L posterior thigh symtpoms. She reports relief with laying on back, leaning forward. Pt denies any previous interventions. Recently consulted APRN at [address]: PT referral.  Currently takes Valium, Norco for pain; previously prescribed for migraines, TMJ issues. Social: unemployed    Pt describes pain level current 5/10, at best 5/10, at worst 8/10. Current functional limitations include:  laying down, sitting too long, sweeping house, bowel movement. Tg Villareal describes prior level of function: chronic LB/R LE c/o for ~ 1 year. Pt goals include: improvement, no pain  Past medical history was reviewed with Tg Villareal.  Significant findings include:   Esophageal dysphagia 8/1/2019   Odynophagia 8/1/2019   Arthralgia of right temporomandibular joint 5/1/2019   Vitamin D insufficiency 12/5/2016   Tobacco use disorder 6/14/2016   Anxiety 6/14/2016   Sinus tachycardia 6/14/2016   Migraines 5/3/2016   Surgeries: 2 c sections    Pt denies diplopia, dysarthria, dysphasia, dizziness, drop attacks, bowel/bladder changes, saddle anesthesia, and NEFTALY LE N/T.    Kai Hinojosa presents to physical therapy evaluation with primary c/o LBP, L buttock pain, R LE pain. The results of the objective tests and measures show: + R SLR, lumbar AROM associated with pain, + c/o lumbar paraspinals. Functional deficits include but are not limited to:  laying down, sitting too long, sweeping house, bowel movement. Signs and symptoms are consistent with diagnosis of chronic LBP with R LE sciatica. Pt and PT discussed evaluation findings, pathology, POC and HEP. Pt voiced understanding and performs HEP correctly without reported pain. Skilled Physical Therapy is medically necessary to address the above impairments and reach functional goals. OBJECTIVE:   Observation/Posture: Standing: no apparent scoliosis  Neuro Screen: Negative L/R SLUMP;  + R SLR, negative L    Lumbar AROM: (* denotes performed with pain)  Flexion: WNL  Extension: WNL* R LBP  Sidebending: R WNL* L LBP; L WNL  Rotation: R WNL*; L WNL*    Accessory motion: not tested  Palpation: + lumbar paraspinals    Strength: (* denotes performed with pain)  LE   Hip abduction: R 4+/5; L 4+/5  Hip Extension: R 4+/5; L 4+/5   Hip ER: R 5-/5; L 5-/5  Knee Flexion: R 5/5; L 5/5   Knee extension (L3): R 5/5; L 5/5   DF (L4): R 5/5; L 5/5       Flexibility:   LE   Hamstrings: R/L restriction       Gait: pt ambulates on level ground with reduced lumbar rotation, [facility] Treatment and Response:   Pt education was provided on exam findings, treatment diagnosis, treatment plan, expectations, and prognosis.  Pt was also provided recommendations for bodymechanics, sitting posture, limit sitting itme  Patient was instructed in and issued a HEP for: Seated or supine L/R sciatic nerve glides, seated or supine figure 4 stretch, prone contralateral hip ext/shoulder flex, prone on elbows or gentle standing lumbar ext   Charges: PT Eval Low Complexity, 1 TE, 1 TA      Total Timed Treatment: 25 min     Total Treatment Time: 45 min     Based on clinical rationale and outcome measures, this evaluation involved Low Complexity decision making. PLAN OF CARE:    Goals: (to be met in 8 -9 visits)   1. Patient to report independence with PT HEP To facilitate self management and to maintain progress made in PT.   2. Patient to report knowledge of proper bodymechanics and sitting posture in order to reduce risk for LBP. 3. Patient to have 5/5 gross hip/lumbar MMT for improved spinal stabilization and improved tolerance for household chores. 4. Patient to report 40% improvement in LBP/R LE c/o to facilitate ADL. 5. Patient to use pillow support in order to sleep 5 hours undisturbed by LBP/R LE c/o. Frequency / Duration: Patient will be seen for 2 x/week or a total of 8-9 visits over a 90 day period. Treatment will include: Gait training, Manual Therapy, Neuromuscular Re-education, Self-Care Home Management, Therapeutic Activities, Therapeutic Exercise, Home Exercise Program instruction and Modalities to include: Electrical stimulation (unattended), Ultrasound and MHP, cold pack    Education or treatment limitation: None  Rehab Potential:fair - good    FOTO: 94.05    Patient/Family/Caregiver was advised of these findings, precautions, and treatment options and has agreed to actively participate in planning and for this course of care. Thank you for your referral. Please co-sign or sign and return this letter via fax as soon as possible to [phone]. If you have any questions, please contact me at Dept: [phone]    Sincerely,  Electronically signed by therapist: Kandy Tomas PT    Physician's certification required: Yes  I certify the need for these services furnished under this plan of treatment and while under my care.     X___________________________________________________ Date____________________    Certification From: 4/92/1094  To:9/18/2022      21st Century Cures Act Notice to Patient: Medical documents like this are made available to patients in the interest of transparency. However, be advised this is a medical document and it is intended as [id number] communication between your medical providers. This medical document may contain abbreviations, assessments, medical data, and results or other terms that are unfamiliar. Medical documents are intended to carry relevant information, facts as evident, and the clinical opinion of the practitioner. As such, this medical document may be written in language that appears blunt or direct. You are encouraged to contact your medical provider and/or [facility] if you have any questions about this medical document.

## (undated) NOTE — ED AVS SNAPSHOT
Castro Gomez   MRN: [id number]    Department:  [facility] Emergency Department   Date of Visit:  3/5/2018           Disclosure     Insurance plans vary and the physician(s) referred by the ER may not be covered by your plan.  Please contact yo CARE PHYSICIAN AT ONCE OR RETURN IMMEDIATELY TO THE EMERGENCY DEPARTMENT. If you have been prescribed any medication(s), please fill your prescription right away and begin taking the medication(s) as directed.   If you believe that any of the medications

## (undated) NOTE — ED AVS SNAPSHOT
Mickie Ceballos   MRN: [id number]    Department:  [facility] Emergency Department   Date of Visit:  2/18/2019           Disclosure     Insurance plans vary and the physician(s) referred by the ER may not be covered by your plan.  Please contact y CARE PHYSICIAN AT ONCE OR RETURN IMMEDIATELY TO THE EMERGENCY DEPARTMENT. If you have been prescribed any medication(s), please fill your prescription right away and begin taking the medication(s) as directed.   If you believe that any of the medications

## (undated) NOTE — ED AVS SNAPSHOT
Eyal Walls   MRN: [id number]    Department:  [facility] Emergency Department   Date of Visit:  6/28/2019           Disclosure     Insurance plans vary and the physician(s) referred by the ER may not be covered by your plan.  Please contact y CARE PHYSICIAN AT ONCE OR RETURN IMMEDIATELY TO THE EMERGENCY DEPARTMENT. If you have been prescribed any medication(s), please fill your prescription right away and begin taking the medication(s) as directed.   If you believe that any of the medications